# Patient Record
Sex: FEMALE | Race: WHITE | NOT HISPANIC OR LATINO | Employment: FULL TIME | URBAN - METROPOLITAN AREA
[De-identification: names, ages, dates, MRNs, and addresses within clinical notes are randomized per-mention and may not be internally consistent; named-entity substitution may affect disease eponyms.]

---

## 2023-06-05 LAB
BASOPHILS (10*3/UL) IN BLOOD BY AUTOMATED COUNT: 0.06 X10E9/L (ref 0–0.1)
BASOPHILS/100 LEUKOCYTES IN BLOOD BY AUTOMATED COUNT: 0.9 % (ref 0–2)
EOSINOPHILS (10*3/UL) IN BLOOD BY AUTOMATED COUNT: 0.06 X10E9/L (ref 0–0.7)
EOSINOPHILS/100 LEUKOCYTES IN BLOOD BY AUTOMATED COUNT: 0.9 % (ref 0–6)
ERYTHROCYTE DISTRIBUTION WIDTH (RATIO) BY AUTOMATED COUNT: 14.1 % (ref 11.5–14.5)
ERYTHROCYTE MEAN CORPUSCULAR HEMOGLOBIN CONCENTRATION (G/DL) BY AUTOMATED: 29.9 G/DL (ref 32–36)
ERYTHROCYTE MEAN CORPUSCULAR VOLUME (FL) BY AUTOMATED COUNT: 84 FL (ref 80–100)
ERYTHROCYTES (10*6/UL) IN BLOOD BY AUTOMATED COUNT: 4.7 X10E12/L (ref 4–5.2)
FOLLITROPIN (IU/L) IN SER/PLAS: 12.7 IU/L
HEMATOCRIT (%) IN BLOOD BY AUTOMATED COUNT: 39.4 % (ref 36–46)
HEMOGLOBIN (G/DL) IN BLOOD: 11.8 G/DL (ref 12–16)
IMMATURE GRANULOCYTES/100 LEUKOCYTES IN BLOOD BY AUTOMATED COUNT: 0.3 % (ref 0–0.9)
LEUKOCYTES (10*3/UL) IN BLOOD BY AUTOMATED COUNT: 6.4 X10E9/L (ref 4.4–11.3)
LUTEINIZING HORMONE (IU/ML) IN SER/PLAS: 53 IU/L
LYMPHOCYTES (10*3/UL) IN BLOOD BY AUTOMATED COUNT: 1.46 X10E9/L (ref 1.2–4.8)
LYMPHOCYTES/100 LEUKOCYTES IN BLOOD BY AUTOMATED COUNT: 22.9 % (ref 13–44)
MONOCYTES (10*3/UL) IN BLOOD BY AUTOMATED COUNT: 0.54 X10E9/L (ref 0.1–1)
MONOCYTES/100 LEUKOCYTES IN BLOOD BY AUTOMATED COUNT: 8.5 % (ref 2–10)
NEUTROPHILS (10*3/UL) IN BLOOD BY AUTOMATED COUNT: 4.24 X10E9/L (ref 1.2–7.7)
NEUTROPHILS/100 LEUKOCYTES IN BLOOD BY AUTOMATED COUNT: 66.5 % (ref 40–80)
NRBC (PER 100 WBCS) BY AUTOMATED COUNT: 0 /100 WBC (ref 0–0)
PLATELETS (10*3/UL) IN BLOOD AUTOMATED COUNT: 316 X10E9/L (ref 150–450)
PROLACTIN (UG/L) IN SER/PLAS: 6.8 UG/L (ref 3–20)
THYROTROPIN (MIU/L) IN SER/PLAS BY DETECTION LIMIT <= 0.05 MIU/L: 0.19 MIU/L (ref 0.44–3.98)
THYROXINE (T4) FREE (NG/DL) IN SER/PLAS: 0.86 NG/DL (ref 0.61–1.12)

## 2023-07-10 ENCOUNTER — HOSPITAL ENCOUNTER (OUTPATIENT)
Dept: DATA CONVERSION | Facility: HOSPITAL | Age: 28
End: 2023-07-10
Attending: OBSTETRICS & GYNECOLOGY | Admitting: OBSTETRICS & GYNECOLOGY

## 2023-07-10 DIAGNOSIS — N92.0 EXCESSIVE AND FREQUENT MENSTRUATION WITH REGULAR CYCLE: ICD-10-CM

## 2023-07-12 LAB
COMPLETE PATHOLOGY REPORT: NORMAL
CONVERTED CLINICAL DIAGNOSIS-HISTORY: NORMAL
CONVERTED FINAL DIAGNOSIS: NORMAL
CONVERTED FINAL REPORT PDF LINK TO COPY AND PASTE: NORMAL
CONVERTED GROSS DESCRIPTION: NORMAL

## 2023-09-29 VITALS — WEIGHT: 222.66 LBS | HEIGHT: 65 IN | BODY MASS INDEX: 37.1 KG/M2

## 2023-09-30 NOTE — H&P
"    History & Physical Reviewed:   Pregnant/Lactating:  ·  Are You Pregnant no (1)   ·  Are You Currently Breastfeeding no (1)     I have reviewed the History and Physical dated:  07-Jul-2023   History and Physical reviewed and relevant findings noted. Patient examined to review pertinent physical  findings.: No significant changes   Home Medications Reviewed: no changes noted   Allergies Reviewed: no changes noted       ERAS (Enhanced Recovery After Surgery):  ·  ERAS Patient: no     Consent:   COVID-19 Consent:  ·  COVID-19 Risk Consent Surgeon has reviewed key risks related to the risk of cristhian COVID-19 and if they contract COVID-19 what the risks are.       Electronic Signatures:  Jacky Carranza)  (Signed 10-Jul-2023 08:14)   Authored: History & Physical Reviewed, ERAS, Consent,  Note Completion      Last Updated: 10-Jul-2023 08:14 by Jacky Carranza (MD)    References:  1.  Data Referenced From \"Patient Profile - Preop v3\" 10-Jul-2023 07:36   "

## 2023-10-02 NOTE — OP NOTE
Post Operative Note:     PreOp Diagnosis: Menorrhagia   Post-Procedure Diagnosis: Same   Procedure: 1.  Hysteroscopy  2.  D&C  3.  NovaSure endometrial ablation  4.   5.   Surgeon: Dr. Carranza   Resident/Fellow/Other Assistant: None   Anesthesia: General LMA   Estimated Blood Loss (mL): Less than 5 cc   Specimen: yes. Endometrial curettings   Complications: None   Findings: Normal endometrial cavity     Operative Report Dictated:  Dictation: not applicable - note contains Operative  Report   Operative Report:    Patient was taken into the operative suite and after given adequate general LMA anesthesia was placed in the dorsolithotomy position prepped and draped in usual sterile  fashion.  Speculum was placed into the vagina the anterior lip of the cervix was grasped with a single-tooth tenaculum and the cervix was dilated to a #8 size Hegar dilator.  Hysteroscope was then introduced and saline was used as a dilating medium.   Upon visualization of the endometrial cavity there appeared to be normal uterine ostia bilaterally no evidence of submucous myomas or polyps.  At this point the hysteroscope was removed a medium size sharp curette was used to curette the cavity throughout  specimen was handed off to pathology.  Cavity length was determined to be 6 cm cavity width was determined to be 3.3 cm.  NovaSure endometrial ablation device was introduced cavity assessment was obtained procedure was commenced.  Once procedure was completed  device was removed hysteroscope was reintroduced there was adequate ablation throughout.  Tenaculum was taken off the anterior lip of the cervix this did have some spotty bleeding which was controlled with silver nitrate and pressure.  Patient tolerated  the procedure well there were no complications she was taken recovery room in stable condition.      Electronic Signatures:  Jacky Carranza)  (Signed 10-Jul-2023 09:14)   Authored: Post Operative Note, Note Completion      Last  Updated: 10-Jul-2023 09:14 by Jacky Carranza)

## 2025-02-25 ENCOUNTER — HOSPITAL ENCOUNTER (EMERGENCY)
Facility: HOSPITAL | Age: 30
Discharge: HOME | End: 2025-02-25
Attending: EMERGENCY MEDICINE
Payer: COMMERCIAL

## 2025-02-25 VITALS
DIASTOLIC BLOOD PRESSURE: 73 MMHG | BODY MASS INDEX: 28.32 KG/M2 | HEIGHT: 65 IN | RESPIRATION RATE: 20 BRPM | TEMPERATURE: 97.3 F | WEIGHT: 170 LBS | OXYGEN SATURATION: 97 % | HEART RATE: 96 BPM | SYSTOLIC BLOOD PRESSURE: 123 MMHG

## 2025-02-25 DIAGNOSIS — H66.002 NON-RECURRENT ACUTE SUPPURATIVE OTITIS MEDIA OF LEFT EAR WITHOUT SPONTANEOUS RUPTURE OF TYMPANIC MEMBRANE: Primary | ICD-10-CM

## 2025-02-25 PROCEDURE — 99283 EMERGENCY DEPT VISIT LOW MDM: CPT | Performed by: EMERGENCY MEDICINE

## 2025-02-25 PROCEDURE — 2500000001 HC RX 250 WO HCPCS SELF ADMINISTERED DRUGS (ALT 637 FOR MEDICARE OP): Performed by: EMERGENCY MEDICINE

## 2025-02-25 RX ORDER — AMOXICILLIN 500 MG/1
500 CAPSULE ORAL ONCE
Status: COMPLETED | OUTPATIENT
Start: 2025-02-25 | End: 2025-02-25

## 2025-02-25 RX ORDER — AMOXICILLIN 500 MG/1
500 CAPSULE ORAL 3 TIMES DAILY
Qty: 21 CAPSULE | Refills: 0 | Status: SHIPPED | OUTPATIENT
Start: 2025-02-25 | End: 2025-03-04

## 2025-02-25 RX ADMIN — AMOXICILLIN 500 MG: 500 CAPSULE ORAL at 04:42

## 2025-02-25 ASSESSMENT — COLUMBIA-SUICIDE SEVERITY RATING SCALE - C-SSRS
6. HAVE YOU EVER DONE ANYTHING, STARTED TO DO ANYTHING, OR PREPARED TO DO ANYTHING TO END YOUR LIFE?: NO
1. IN THE PAST MONTH, HAVE YOU WISHED YOU WERE DEAD OR WISHED YOU COULD GO TO SLEEP AND NOT WAKE UP?: NO
2. HAVE YOU ACTUALLY HAD ANY THOUGHTS OF KILLING YOURSELF?: NO

## 2025-02-25 ASSESSMENT — PAIN DESCRIPTION - PAIN TYPE: TYPE: ACUTE PAIN

## 2025-02-25 ASSESSMENT — PAIN DESCRIPTION - DESCRIPTORS: DESCRIPTORS: ACHING

## 2025-02-25 ASSESSMENT — PAIN DESCRIPTION - LOCATION: LOCATION: EAR

## 2025-02-25 ASSESSMENT — PAIN DESCRIPTION - ORIENTATION: ORIENTATION: LEFT

## 2025-02-25 ASSESSMENT — PAIN - FUNCTIONAL ASSESSMENT: PAIN_FUNCTIONAL_ASSESSMENT: 0-10

## 2025-02-25 ASSESSMENT — PAIN SCALES - GENERAL: PAINLEVEL_OUTOF10: 5 - MODERATE PAIN

## 2025-02-25 NOTE — ED TRIAGE NOTES
PT arrives via private vehicle from home with c/o Left air pain that started yesterday and is getting worse. PT took 600mg Ibuprofen around 0300 this morning, pt states it took the edge off, but did not resolve the symptom.

## 2025-02-25 NOTE — ED PROVIDER NOTES
HPI   Chief Complaint   Patient presents with    Earache       The patient is a 29-year-old female with a history of lupus not on active treatment here for left ear pain in the setting of recent viral URI.  She denies any fevers, chills, nausea, or vomiting.  She is experiencing some rhinorrhea, but denies sore throat and cough.  She has taken ibuprofen for the pain which helps, however she woke up approximately an hour and a half ago with severe pain which prompted her visit to the emergency department.              Patient History   Past Medical History:   Diagnosis Date    Encounter for supervision of normal first pregnancy, third trimester ([facility])     Encounter for prenatal care in third trimester of first pregnancy    Encounter for supervision of normal pregnancy, unspecified, unspecified trimester ([facility]) 2021    Pregnancy at early stage    Encounter for supervision of normal pregnancy, unspecified, unspecified trimester ([facility]) 2020    Pregnancy at early stage    Schizoaffective disorder, unspecified ([facility])     Schizo affective schizophrenia     Past Surgical History:   Procedure Laterality Date     SECTION, CLASSIC  10/27/2017     Section    DILATION AND CURETTAGE OF UTERUS  2017    Dilation And Curettage    OTHER SURGICAL HISTORY  2016    Biopsy Skin     No family history on file.  Social History     Tobacco Use    Smoking status: Not on file    Smokeless tobacco: Not on file   Substance Use Topics    Alcohol use: Not on file    Drug use: Not on file       Physical Exam   ED Triage Vitals   Temperature Heart Rate Respirations BP   258 258 258 25 0423   36.3 °C (97.3 °F) 96 20 123/73      Pulse Ox Temp Source Heart Rate Source Patient Position   25 0418 25 0418 25 0418 25   97 % Temporal Monitor Sitting      BP Location FiO2 (%)     25 --     Right arm        Physical Exam  General:  Alert and in no acute distress and sitting comfortably in the stretcher.  HEENT:  EOMI. pupils equal and round.  No conjunctival injection or hemorrhage.  Throat is not erythematous without tonsillar edema or exudate.  Right tympanic membrane is  opaque, but not erythematous.  Left tympanic membrane is partially obscured by purulence.  Observed membrane is erythematous.  No erythema of the external auditory canal on the left.  Respiratory: Nonlabored  Cardiovascular: Regular rate and regular rhythm.   Neurologic: Alert and answering questions appropriately.     ED Course & MDM   Diagnoses as of 02/25/25 0442   Non-recurrent acute suppurative otitis media of left ear without spontaneous rupture of tympanic membrane                 No data recorded     Amalia Coma Scale Score: 15 (02/25/25 0426 : Jay Wells RN)                           Medical Decision Making  Patient is a 29-year-old female here for left ear pain.  She is normotensive, nontachycardic, and afebrile.  Physical examination is consistent with acute otitis media.  She has no recent antibiotic use. patient was given a dose of amoxicillin here in the emergency department and discharged with a prescription for this.    She recently moved back to the [facility] from [place] and therefore was given a referral for primary care    Procedure  Procedures     Freda Christine MD  02/25/25 5416

## 2025-04-08 ENCOUNTER — APPOINTMENT (OUTPATIENT)
Dept: CARDIOLOGY | Facility: HOSPITAL | Age: 30
End: 2025-04-08
Payer: COMMERCIAL

## 2025-04-08 ENCOUNTER — APPOINTMENT (OUTPATIENT)
Dept: RADIOLOGY | Facility: HOSPITAL | Age: 30
End: 2025-04-08
Payer: COMMERCIAL

## 2025-04-08 ENCOUNTER — HOSPITAL ENCOUNTER (INPATIENT)
Facility: HOSPITAL | Age: 30
LOS: 2 days | Discharge: HOME | End: 2025-04-10
Attending: EMERGENCY MEDICINE | Admitting: INTERNAL MEDICINE
Payer: COMMERCIAL

## 2025-04-08 DIAGNOSIS — R06.02 SHORTNESS OF BREATH: ICD-10-CM

## 2025-04-08 DIAGNOSIS — J18.9 PNEUMONIA OF BOTH LUNGS DUE TO INFECTIOUS ORGANISM, UNSPECIFIED PART OF LUNG: Primary | ICD-10-CM

## 2025-04-08 DIAGNOSIS — R00.0 SINUS TACHYCARDIA: ICD-10-CM

## 2025-04-08 LAB
ALBUMIN SERPL BCP-MCNC: 4.2 G/DL (ref 3.4–5)
ALP SERPL-CCNC: 74 U/L (ref 33–110)
ALT SERPL W P-5'-P-CCNC: 18 U/L (ref 7–45)
ANION GAP SERPL CALC-SCNC: 13 MMOL/L (ref 10–20)
AST SERPL W P-5'-P-CCNC: 12 U/L (ref 9–39)
B-HCG SERPL-ACNC: <2 MIU/ML
BASE EXCESS BLDV CALC-SCNC: 2.2 MMOL/L (ref -2–3)
BASOPHILS # BLD AUTO: 0.02 X10*3/UL (ref 0–0.1)
BASOPHILS NFR BLD AUTO: 0.2 %
BILIRUB SERPL-MCNC: 1.1 MG/DL (ref 0–1.2)
BNP SERPL-MCNC: 8 PG/ML (ref 0–99)
BODY TEMPERATURE: 37 DEGREES CELSIUS
BUN SERPL-MCNC: 12 MG/DL (ref 6–23)
CALCIUM SERPL-MCNC: 9.2 MG/DL (ref 8.6–10.3)
CARDIAC TROPONIN I PNL SERPL HS: <3 NG/L (ref 0–13)
CARDIAC TROPONIN I PNL SERPL HS: <3 NG/L (ref 0–13)
CHLORIDE SERPL-SCNC: 103 MMOL/L (ref 98–107)
CO2 SERPL-SCNC: 25 MMOL/L (ref 21–32)
CREAT SERPL-MCNC: 0.68 MG/DL (ref 0.5–1.05)
D DIMER PPP FEU-MCNC: 266 NG/ML FEU
EGFRCR SERPLBLD CKD-EPI 2021: >90 ML/MIN/1.73M*2
EOSINOPHIL # BLD AUTO: 0.02 X10*3/UL (ref 0–0.7)
EOSINOPHIL NFR BLD AUTO: 0.2 %
ERYTHROCYTE [DISTWIDTH] IN BLOOD BY AUTOMATED COUNT: 13.3 % (ref 11.5–14.5)
FLUAV RNA RESP QL NAA+PROBE: NOT DETECTED
FLUBV RNA RESP QL NAA+PROBE: NOT DETECTED
GLUCOSE SERPL-MCNC: 98 MG/DL (ref 74–99)
HCO3 BLDV-SCNC: 25.3 MMOL/L (ref 22–26)
HCT VFR BLD AUTO: 43.2 % (ref 36–46)
HGB BLD-MCNC: 13.9 G/DL (ref 12–16)
IMM GRANULOCYTES # BLD AUTO: 0.06 X10*3/UL (ref 0–0.7)
IMM GRANULOCYTES NFR BLD AUTO: 0.5 % (ref 0–0.9)
INHALED O2 CONCENTRATION: 21 %
LACTATE SERPL-SCNC: 0.7 MMOL/L (ref 0.4–2)
LYMPHOCYTES # BLD AUTO: 0.95 X10*3/UL (ref 1.2–4.8)
LYMPHOCYTES NFR BLD AUTO: 8.5 %
MAGNESIUM SERPL-MCNC: 1.62 MG/DL (ref 1.6–2.4)
MCH RBC QN AUTO: 28.5 PG (ref 26–34)
MCHC RBC AUTO-ENTMCNC: 32.2 G/DL (ref 32–36)
MCV RBC AUTO: 89 FL (ref 80–100)
MONOCYTES # BLD AUTO: 0.43 X10*3/UL (ref 0.1–1)
MONOCYTES NFR BLD AUTO: 3.8 %
NEUTROPHILS # BLD AUTO: 9.76 X10*3/UL (ref 1.2–7.7)
NEUTROPHILS NFR BLD AUTO: 86.8 %
NRBC BLD-RTO: 0 /100 WBCS (ref 0–0)
OXYHGB MFR BLDV: 30.1 % (ref 45–75)
PCO2 BLDV: 34 MM HG (ref 41–51)
PH BLDV: 7.48 PH (ref 7.33–7.43)
PLATELET # BLD AUTO: 252 X10*3/UL (ref 150–450)
PO2 BLDV: 18 MM HG (ref 35–45)
POTASSIUM SERPL-SCNC: 3.6 MMOL/L (ref 3.5–5.3)
PROT SERPL-MCNC: 6.8 G/DL (ref 6.4–8.2)
RBC # BLD AUTO: 4.87 X10*6/UL (ref 4–5.2)
RSV RNA RESP QL NAA+PROBE: NOT DETECTED
SAO2 % BLDV: 32 % (ref 45–75)
SARS-COV-2 RNA RESP QL NAA+PROBE: NOT DETECTED
SODIUM SERPL-SCNC: 137 MMOL/L (ref 136–145)
TSH SERPL-ACNC: 0.72 MIU/L (ref 0.44–3.98)
WBC # BLD AUTO: 11.2 X10*3/UL (ref 4.4–11.3)

## 2025-04-08 PROCEDURE — 36415 COLL VENOUS BLD VENIPUNCTURE: CPT | Performed by: PHYSICIAN ASSISTANT

## 2025-04-08 PROCEDURE — 2500000004 HC RX 250 GENERAL PHARMACY W/ HCPCS (ALT 636 FOR OP/ED): Performed by: INTERNAL MEDICINE

## 2025-04-08 PROCEDURE — 85379 FIBRIN DEGRADATION QUANT: CPT | Performed by: PHYSICIAN ASSISTANT

## 2025-04-08 PROCEDURE — 71275 CT ANGIOGRAPHY CHEST: CPT

## 2025-04-08 PROCEDURE — 85025 COMPLETE CBC W/AUTO DIFF WBC: CPT | Performed by: PHYSICIAN ASSISTANT

## 2025-04-08 PROCEDURE — 2500000004 HC RX 250 GENERAL PHARMACY W/ HCPCS (ALT 636 FOR OP/ED): Performed by: PHYSICIAN ASSISTANT

## 2025-04-08 PROCEDURE — 2550000001 HC RX 255 CONTRASTS: Performed by: PHYSICIAN ASSISTANT

## 2025-04-08 PROCEDURE — 71275 CT ANGIOGRAPHY CHEST: CPT | Performed by: RADIOLOGY

## 2025-04-08 PROCEDURE — 2500000004 HC RX 250 GENERAL PHARMACY W/ HCPCS (ALT 636 FOR OP/ED): Performed by: NURSE PRACTITIONER

## 2025-04-08 PROCEDURE — 83605 ASSAY OF LACTIC ACID: CPT | Performed by: PHYSICIAN ASSISTANT

## 2025-04-08 PROCEDURE — 87449 NOS EACH ORGANISM AG IA: CPT | Mod: PARLAB | Performed by: NURSE PRACTITIONER

## 2025-04-08 PROCEDURE — 99285 EMERGENCY DEPT VISIT HI MDM: CPT | Mod: 25 | Performed by: EMERGENCY MEDICINE

## 2025-04-08 PROCEDURE — 83735 ASSAY OF MAGNESIUM: CPT | Performed by: PHYSICIAN ASSISTANT

## 2025-04-08 PROCEDURE — 82810 BLOOD GASES O2 SAT ONLY: CPT | Performed by: PHYSICIAN ASSISTANT

## 2025-04-08 PROCEDURE — 71045 X-RAY EXAM CHEST 1 VIEW: CPT | Performed by: STUDENT IN AN ORGANIZED HEALTH CARE EDUCATION/TRAINING PROGRAM

## 2025-04-08 PROCEDURE — 96361 HYDRATE IV INFUSION ADD-ON: CPT

## 2025-04-08 PROCEDURE — 87040 BLOOD CULTURE FOR BACTERIA: CPT | Mod: PARLAB | Performed by: PHYSICIAN ASSISTANT

## 2025-04-08 PROCEDURE — 93005 ELECTROCARDIOGRAM TRACING: CPT

## 2025-04-08 PROCEDURE — 71045 X-RAY EXAM CHEST 1 VIEW: CPT

## 2025-04-08 PROCEDURE — 1200000002 HC GENERAL ROOM WITH TELEMETRY DAILY

## 2025-04-08 PROCEDURE — 87636 SARSCOV2 & INF A&B AMP PRB: CPT | Performed by: PHYSICIAN ASSISTANT

## 2025-04-08 PROCEDURE — 87899 AGENT NOS ASSAY W/OPTIC: CPT | Mod: PARLAB | Performed by: NURSE PRACTITIONER

## 2025-04-08 PROCEDURE — 83880 ASSAY OF NATRIURETIC PEPTIDE: CPT | Performed by: PHYSICIAN ASSISTANT

## 2025-04-08 PROCEDURE — 96375 TX/PRO/DX INJ NEW DRUG ADDON: CPT

## 2025-04-08 PROCEDURE — 84702 CHORIONIC GONADOTROPIN TEST: CPT | Performed by: PHYSICIAN ASSISTANT

## 2025-04-08 PROCEDURE — 84443 ASSAY THYROID STIM HORMONE: CPT | Performed by: PHYSICIAN ASSISTANT

## 2025-04-08 PROCEDURE — 84075 ASSAY ALKALINE PHOSPHATASE: CPT | Performed by: PHYSICIAN ASSISTANT

## 2025-04-08 PROCEDURE — 96365 THER/PROPH/DIAG IV INF INIT: CPT

## 2025-04-08 PROCEDURE — 84484 ASSAY OF TROPONIN QUANT: CPT | Performed by: PHYSICIAN ASSISTANT

## 2025-04-08 RX ORDER — CEFTRIAXONE 2 G/50ML
2 INJECTION, SOLUTION INTRAVENOUS EVERY 24 HOURS
Status: DISCONTINUED | OUTPATIENT
Start: 2025-04-09 | End: 2025-04-10 | Stop reason: HOSPADM

## 2025-04-08 RX ORDER — IPRATROPIUM BROMIDE AND ALBUTEROL SULFATE 2.5; .5 MG/3ML; MG/3ML
3 SOLUTION RESPIRATORY (INHALATION)
Status: DISCONTINUED | OUTPATIENT
Start: 2025-04-08 | End: 2025-04-08

## 2025-04-08 RX ORDER — DEXTROAMPHETAMINE SACCHARATE, AMPHETAMINE ASPARTATE, DEXTROAMPHETAMINE SULFATE AND AMPHETAMINE SULFATE 7.5; 7.5; 7.5; 7.5 MG/1; MG/1; MG/1; MG/1
1 TABLET ORAL
COMMUNITY
Start: 2025-04-01

## 2025-04-08 RX ORDER — IPRATROPIUM BROMIDE AND ALBUTEROL SULFATE 2.5; .5 MG/3ML; MG/3ML
3 SOLUTION RESPIRATORY (INHALATION) EVERY 2 HOUR PRN
Status: DISCONTINUED | OUTPATIENT
Start: 2025-04-08 | End: 2025-04-10 | Stop reason: HOSPADM

## 2025-04-08 RX ORDER — DEXTROAMPHETAMINE SACCHARATE, AMPHETAMINE ASPARTATE, DEXTROAMPHETAMINE SULFATE AND AMPHETAMINE SULFATE 7.5; 7.5; 7.5; 7.5 MG/1; MG/1; MG/1; MG/1
30 TABLET ORAL
Status: DISCONTINUED | OUTPATIENT
Start: 2025-04-08 | End: 2025-04-10 | Stop reason: HOSPADM

## 2025-04-08 RX ORDER — LANOLIN ALCOHOL/MO/W.PET/CERES
400 CREAM (GRAM) TOPICAL DAILY
Status: DISCONTINUED | OUTPATIENT
Start: 2025-04-09 | End: 2025-04-10 | Stop reason: HOSPADM

## 2025-04-08 RX ORDER — IPRATROPIUM BROMIDE AND ALBUTEROL SULFATE 2.5; .5 MG/3ML; MG/3ML
3 SOLUTION RESPIRATORY (INHALATION)
Status: DISCONTINUED | OUTPATIENT
Start: 2025-04-09 | End: 2025-04-10 | Stop reason: HOSPADM

## 2025-04-08 RX ORDER — SODIUM CHLORIDE, SODIUM LACTATE, POTASSIUM CHLORIDE, CALCIUM CHLORIDE 600; 310; 30; 20 MG/100ML; MG/100ML; MG/100ML; MG/100ML
50 INJECTION, SOLUTION INTRAVENOUS CONTINUOUS
Status: ACTIVE | OUTPATIENT
Start: 2025-04-08 | End: 2025-04-09

## 2025-04-08 RX ORDER — BENZONATATE 100 MG/1
100 CAPSULE ORAL 3 TIMES DAILY PRN
Status: DISCONTINUED | OUTPATIENT
Start: 2025-04-08 | End: 2025-04-10 | Stop reason: HOSPADM

## 2025-04-08 RX ORDER — GUAIFENESIN 600 MG/1
600 TABLET, EXTENDED RELEASE ORAL 2 TIMES DAILY
Status: DISCONTINUED | OUTPATIENT
Start: 2025-04-08 | End: 2025-04-10 | Stop reason: HOSPADM

## 2025-04-08 RX ORDER — CEFTRIAXONE 2 G/50ML
2 INJECTION, SOLUTION INTRAVENOUS ONCE
Status: COMPLETED | OUTPATIENT
Start: 2025-04-08 | End: 2025-04-08

## 2025-04-08 RX ORDER — ACETAMINOPHEN 325 MG/1
650 TABLET ORAL EVERY 4 HOURS PRN
Status: DISCONTINUED | OUTPATIENT
Start: 2025-04-08 | End: 2025-04-10 | Stop reason: HOSPADM

## 2025-04-08 RX ADMIN — CEFTRIAXONE SODIUM 2 G: 2 INJECTION, SOLUTION INTRAVENOUS at 21:22

## 2025-04-08 RX ADMIN — SODIUM CHLORIDE 1000 ML: 0.9 INJECTION, SOLUTION INTRAVENOUS at 19:34

## 2025-04-08 RX ADMIN — AZITHROMYCIN MONOHYDRATE 500 MG: 500 INJECTION, POWDER, LYOPHILIZED, FOR SOLUTION INTRAVENOUS at 21:48

## 2025-04-08 RX ADMIN — IOHEXOL 80 ML: 350 INJECTION, SOLUTION INTRAVENOUS at 20:13

## 2025-04-08 RX ADMIN — GUAIFENESIN 600 MG: 600 TABLET, EXTENDED RELEASE ORAL at 22:18

## 2025-04-08 RX ADMIN — SODIUM CHLORIDE, SODIUM LACTATE, POTASSIUM CHLORIDE, AND CALCIUM CHLORIDE 50 ML/HR: .6; .31; .03; .02 INJECTION, SOLUTION INTRAVENOUS at 22:18

## 2025-04-08 RX ADMIN — SODIUM CHLORIDE 1000 ML: 9 INJECTION, SOLUTION INTRAVENOUS at 18:43

## 2025-04-08 RX ADMIN — DEXAMETHASONE SODIUM PHOSPHATE 4 MG: 4 INJECTION, SOLUTION INTRAMUSCULAR; INTRAVENOUS at 21:48

## 2025-04-08 ASSESSMENT — PAIN SCALES - GENERAL
PAINLEVEL_OUTOF10: 3
PAINLEVEL_OUTOF10: 4

## 2025-04-08 ASSESSMENT — PAIN DESCRIPTION - DESCRIPTORS: DESCRIPTORS: TIGHTNESS

## 2025-04-08 NOTE — ED TRIAGE NOTES
Patient states cough, coughed this AM- had sharp chest pain following the cough that has lasted all day. Patient also endorses shortness of breath, feels like SOB is worse than cp. Patient also has lupus.

## 2025-04-09 LAB
25(OH)D3 SERPL-MCNC: 10 NG/ML (ref 30–100)
ANION GAP SERPL CALC-SCNC: 13 MMOL/L (ref 10–20)
BUN SERPL-MCNC: 8 MG/DL (ref 6–23)
CALCIUM SERPL-MCNC: 8.8 MG/DL (ref 8.6–10.3)
CHLORIDE SERPL-SCNC: 105 MMOL/L (ref 98–107)
CO2 SERPL-SCNC: 21 MMOL/L (ref 21–32)
CREAT SERPL-MCNC: 0.48 MG/DL (ref 0.5–1.05)
EGFRCR SERPLBLD CKD-EPI 2021: >90 ML/MIN/1.73M*2
ERYTHROCYTE [DISTWIDTH] IN BLOOD BY AUTOMATED COUNT: 12.9 % (ref 11.5–14.5)
GLUCOSE BLD MANUAL STRIP-MCNC: 120 MG/DL (ref 74–99)
GLUCOSE SERPL-MCNC: 123 MG/DL (ref 74–99)
HCT VFR BLD AUTO: 40.6 % (ref 36–46)
HGB BLD-MCNC: 12.4 G/DL (ref 12–16)
HIV 1+2 AB+HIV1 P24 AG SERPL QL IA: NONREACTIVE
HOLD SPECIMEN: NORMAL
LACTATE SERPL-SCNC: 0.7 MMOL/L (ref 0.4–2)
LEGIONELLA AG UR QL: NEGATIVE
MCH RBC QN AUTO: 28.4 PG (ref 26–34)
MCHC RBC AUTO-ENTMCNC: 30.5 G/DL (ref 32–36)
MCV RBC AUTO: 93 FL (ref 80–100)
NRBC BLD-RTO: 0 /100 WBCS (ref 0–0)
PLATELET # BLD AUTO: 219 X10*3/UL (ref 150–450)
POTASSIUM SERPL-SCNC: 4.2 MMOL/L (ref 3.5–5.3)
RBC # BLD AUTO: 4.37 X10*6/UL (ref 4–5.2)
S PNEUM AG UR QL: NEGATIVE
SODIUM SERPL-SCNC: 135 MMOL/L (ref 136–145)
WBC # BLD AUTO: 13.3 X10*3/UL (ref 4.4–11.3)

## 2025-04-09 PROCEDURE — 82306 VITAMIN D 25 HYDROXY: CPT | Mod: PARLAB | Performed by: NURSE PRACTITIONER

## 2025-04-09 PROCEDURE — 2500000004 HC RX 250 GENERAL PHARMACY W/ HCPCS (ALT 636 FOR OP/ED): Performed by: NURSE PRACTITIONER

## 2025-04-09 PROCEDURE — 2500000001 HC RX 250 WO HCPCS SELF ADMINISTERED DRUGS (ALT 637 FOR MEDICARE OP): Performed by: NURSE PRACTITIONER

## 2025-04-09 PROCEDURE — 83605 ASSAY OF LACTIC ACID: CPT | Performed by: INTERNAL MEDICINE

## 2025-04-09 PROCEDURE — 87449 NOS EACH ORGANISM AG IA: CPT | Mod: PARLAB | Performed by: INTERNAL MEDICINE

## 2025-04-09 PROCEDURE — 87389 HIV-1 AG W/HIV-1&-2 AB AG IA: CPT | Mod: PARLAB | Performed by: INTERNAL MEDICINE

## 2025-04-09 PROCEDURE — 2500000004 HC RX 250 GENERAL PHARMACY W/ HCPCS (ALT 636 FOR OP/ED): Performed by: INTERNAL MEDICINE

## 2025-04-09 PROCEDURE — 80048 BASIC METABOLIC PNL TOTAL CA: CPT | Performed by: NURSE PRACTITIONER

## 2025-04-09 PROCEDURE — 1200000002 HC GENERAL ROOM WITH TELEMETRY DAILY

## 2025-04-09 PROCEDURE — 87899 AGENT NOS ASSAY W/OPTIC: CPT | Mod: PARLAB | Performed by: INTERNAL MEDICINE

## 2025-04-09 PROCEDURE — 36415 COLL VENOUS BLD VENIPUNCTURE: CPT | Performed by: INTERNAL MEDICINE

## 2025-04-09 PROCEDURE — 2500000002 HC RX 250 W HCPCS SELF ADMINISTERED DRUGS (ALT 637 FOR MEDICARE OP, ALT 636 FOR OP/ED): Performed by: EMERGENCY MEDICINE

## 2025-04-09 PROCEDURE — 2500000005 HC RX 250 GENERAL PHARMACY W/O HCPCS: Performed by: INTERNAL MEDICINE

## 2025-04-09 PROCEDURE — 85027 COMPLETE CBC AUTOMATED: CPT | Performed by: NURSE PRACTITIONER

## 2025-04-09 PROCEDURE — 82947 ASSAY GLUCOSE BLOOD QUANT: CPT

## 2025-04-09 PROCEDURE — 94640 AIRWAY INHALATION TREATMENT: CPT

## 2025-04-09 RX ORDER — SODIUM CHLORIDE 9 MG/ML
100 INJECTION, SOLUTION INTRAVENOUS CONTINUOUS
Status: ACTIVE | OUTPATIENT
Start: 2025-04-09 | End: 2025-04-10

## 2025-04-09 RX ADMIN — AZITHROMYCIN MONOHYDRATE 500 MG: 500 INJECTION, POWDER, LYOPHILIZED, FOR SOLUTION INTRAVENOUS at 21:32

## 2025-04-09 RX ADMIN — Medication 2 L/MIN: at 10:00

## 2025-04-09 RX ADMIN — IPRATROPIUM BROMIDE AND ALBUTEROL SULFATE 3 ML: .5; 3 SOLUTION RESPIRATORY (INHALATION) at 07:08

## 2025-04-09 RX ADMIN — GUAIFENESIN 600 MG: 600 TABLET, EXTENDED RELEASE ORAL at 09:28

## 2025-04-09 RX ADMIN — IPRATROPIUM BROMIDE AND ALBUTEROL SULFATE 3 ML: .5; 3 SOLUTION RESPIRATORY (INHALATION) at 19:12

## 2025-04-09 RX ADMIN — ACETAMINOPHEN 650 MG: 325 TABLET, FILM COATED ORAL at 17:10

## 2025-04-09 RX ADMIN — MAGNESIUM OXIDE TAB 400 MG (241.3 MG ELEMENTAL MG) 400 MG: 400 (241.3 MG) TAB at 09:28

## 2025-04-09 RX ADMIN — CEFTRIAXONE SODIUM 2 G: 2 INJECTION, SOLUTION INTRAVENOUS at 20:36

## 2025-04-09 RX ADMIN — GUAIFENESIN 600 MG: 600 TABLET, EXTENDED RELEASE ORAL at 21:32

## 2025-04-09 RX ADMIN — IPRATROPIUM BROMIDE AND ALBUTEROL SULFATE 3 ML: .5; 3 SOLUTION RESPIRATORY (INHALATION) at 11:51

## 2025-04-09 RX ADMIN — SODIUM CHLORIDE 100 ML/HR: 0.9 INJECTION, SOLUTION INTRAVENOUS at 15:01

## 2025-04-09 RX ADMIN — DEXAMETHASONE SODIUM PHOSPHATE 4 MG: 4 INJECTION, SOLUTION INTRAMUSCULAR; INTRAVENOUS at 14:42

## 2025-04-09 SDOH — SOCIAL STABILITY: SOCIAL INSECURITY: DO YOU FEEL ANYONE HAS EXPLOITED OR TAKEN ADVANTAGE OF YOU FINANCIALLY OR OF YOUR PERSONAL PROPERTY?: NO

## 2025-04-09 SDOH — ECONOMIC STABILITY: TRANSPORTATION INSECURITY
IN THE PAST 12 MONTHS, HAS LACK OF TRANSPORTATION KEPT YOU FROM MEETINGS, WORK, OR FROM GETTING THINGS NEEDED FOR DAILY LIVING?: YES

## 2025-04-09 SDOH — ECONOMIC STABILITY: TRANSPORTATION INSECURITY
IN THE PAST 12 MONTHS, HAS THE LACK OF TRANSPORTATION KEPT YOU FROM MEDICAL APPOINTMENTS OR FROM GETTING MEDICATIONS?: YES

## 2025-04-09 SDOH — SOCIAL STABILITY: SOCIAL INSECURITY: DOES ANYONE TRY TO KEEP YOU FROM HAVING/CONTACTING OTHER FRIENDS OR DOING THINGS OUTSIDE YOUR HOME?: NO

## 2025-04-09 SDOH — ECONOMIC STABILITY: HOUSING INSECURITY

## 2025-04-09 SDOH — ECONOMIC STABILITY: GENERAL

## 2025-04-09 SDOH — ECONOMIC STABILITY: FOOD INSECURITY: WITHIN THE PAST 12 MONTHS, THE FOOD YOU BOUGHT JUST DIDN'T LAST AND YOU DIDN'T HAVE MONEY TO GET MORE.: NEVER TRUE

## 2025-04-09 SDOH — ECONOMIC STABILITY: HOUSING INSECURITY: IN THE LAST 12 MONTHS, HOW MANY PLACES HAVE YOU LIVED?: 3

## 2025-04-09 SDOH — SOCIAL STABILITY: SOCIAL INSECURITY: HAVE YOU HAD THOUGHTS OF HARMING ANYONE ELSE?: NO

## 2025-04-09 SDOH — ECONOMIC STABILITY: INCOME INSECURITY: IN THE LAST 12 MONTHS, WAS THERE A TIME WHEN YOU WERE NOT ABLE TO PAY THE MORTGAGE OR RENT ON TIME?: YES

## 2025-04-09 SDOH — ECONOMIC STABILITY: TRANSPORTATION INSECURITY

## 2025-04-09 SDOH — ECONOMIC STABILITY: FOOD INSECURITY: WITHIN THE PAST 12 MONTHS, YOU WORRIED THAT YOUR FOOD WOULD RUN OUT BEFORE YOU GOT MONEY TO BUY MORE.: SOMETIMES TRUE

## 2025-04-09 SDOH — ECONOMIC STABILITY: HOUSING INSECURITY: IN THE LAST 12 MONTHS, WAS THERE A TIME WHEN YOU WERE NOT ABLE TO PAY THE MORTGAGE OR RENT ON TIME?: YES

## 2025-04-09 SDOH — ECONOMIC STABILITY: TRANSPORTATION INSECURITY: IN THE PAST 12 MONTHS, HAS LACK OF TRANSPORTATION KEPT YOU FROM MEDICAL APPOINTMENTS OR FROM GETTING MEDICATIONS?: YES

## 2025-04-09 SDOH — SOCIAL STABILITY: SOCIAL INSECURITY: DO YOU FEEL UNSAFE GOING BACK TO THE PLACE WHERE YOU ARE LIVING?: NO

## 2025-04-09 SDOH — SOCIAL STABILITY: SOCIAL INSECURITY: ABUSE: ADULT

## 2025-04-09 SDOH — SOCIAL STABILITY: SOCIAL INSECURITY: ARE YOU OR HAVE YOU BEEN THREATENED OR ABUSED PHYSICALLY, EMOTIONALLY, OR SEXUALLY BY ANYONE?: NO

## 2025-04-09 SDOH — SOCIAL STABILITY: SOCIAL INSECURITY: WERE YOU ABLE TO COMPLETE ALL THE BEHAVIORAL HEALTH SCREENINGS?: YES

## 2025-04-09 SDOH — SOCIAL STABILITY: SOCIAL INSECURITY: HAS ANYONE EVER THREATENED TO HURT YOUR FAMILY OR YOUR PETS?: NO

## 2025-04-09 SDOH — ECONOMIC STABILITY: HOUSING INSECURITY
IN THE LAST 12 MONTHS, WAS THERE A TIME WHEN YOU DID NOT HAVE A STEADY PLACE TO SLEEP OR SLEPT IN A SHELTER (INCLUDING NOW)?: YES

## 2025-04-09 SDOH — SOCIAL STABILITY: SOCIAL INSECURITY: ARE THERE ANY APPARENT SIGNS OF INJURIES/BEHAVIORS THAT COULD BE RELATED TO ABUSE/NEGLECT?: NO

## 2025-04-09 SDOH — ECONOMIC STABILITY: HOUSING INSECURITY: IN THE PAST 12 MONTHS HAS THE ELECTRIC, GAS, OIL, OR WATER COMPANY THREATENED TO SHUT OFF SERVICES IN YOUR HOME?: YES

## 2025-04-09 SDOH — ECONOMIC STABILITY: FOOD INSECURITY
WITHIN THE PAST 12 MONTHS, YOU WORRIED THAT YOUR FOOD WOULD RUN OUT BEFORE YOU GOT THE MONEY TO BUY MORE.: SOMETIMES TRUE

## 2025-04-09 SDOH — ECONOMIC STABILITY: FOOD INSECURITY

## 2025-04-09 SDOH — SOCIAL STABILITY: SOCIAL INSECURITY: HAVE YOU HAD ANY THOUGHTS OF HARMING ANYONE ELSE?: NO

## 2025-04-09 ASSESSMENT — COGNITIVE AND FUNCTIONAL STATUS - GENERAL
DAILY ACTIVITIY SCORE: 24
DAILY ACTIVITIY SCORE: 24
MOBILITY SCORE: 24
PATIENT BASELINE BEDBOUND: NO
MOBILITY SCORE: 24

## 2025-04-09 ASSESSMENT — LIFESTYLE VARIABLES
SUBSTANCE_ABUSE_PAST_12_MONTHS: NO
HOW MANY STANDARD DRINKS CONTAINING ALCOHOL DO YOU HAVE ON A TYPICAL DAY: PATIENT DOES NOT DRINK
SKIP TO QUESTIONS 9-10: 1
HOW OFTEN DO YOU HAVE 6 OR MORE DRINKS ON ONE OCCASION: NEVER
HOW OFTEN DO YOU HAVE A DRINK CONTAINING ALCOHOL: NEVER
PRESCIPTION_ABUSE_PAST_12_MONTHS: NO
AUDIT-C TOTAL SCORE: 0
AUDIT-C TOTAL SCORE: 0

## 2025-04-09 ASSESSMENT — PAIN SCALES - WONG BAKER: WONGBAKER_NUMERICALRESPONSE: NO HURT

## 2025-04-09 ASSESSMENT — ACTIVITIES OF DAILY LIVING (ADL)
LACK_OF_TRANSPORTATION: YES
TOILETING: INDEPENDENT
DRESSING YOURSELF: INDEPENDENT
JUDGMENT_ADEQUATE_SAFELY_COMPLETE_DAILY_ACTIVITIES: YES
ADEQUATE_TO_COMPLETE_ADL: YES
WALKS IN HOME: INDEPENDENT
PATIENT'S MEMORY ADEQUATE TO SAFELY COMPLETE DAILY ACTIVITIES?: YES
BATHING: INDEPENDENT
HEARING - RIGHT EAR: FUNCTIONAL
HEARING - LEFT EAR: FUNCTIONAL
GROOMING: INDEPENDENT
FEEDING YOURSELF: INDEPENDENT

## 2025-04-09 ASSESSMENT — SOCIAL DETERMINANTS OF HEALTH (SDOH): IN THE PAST 12 MONTHS, HAS THE ELECTRIC, GAS, OIL, OR WATER COMPANY THREATENED TO SHUT OFF SERVICE IN YOUR HOME?: YES

## 2025-04-09 ASSESSMENT — PATIENT HEALTH QUESTIONNAIRE - PHQ9
1. LITTLE INTEREST OR PLEASURE IN DOING THINGS: NOT AT ALL
2. FEELING DOWN, DEPRESSED OR HOPELESS: NOT AT ALL
SUM OF ALL RESPONSES TO PHQ9 QUESTIONS 1 & 2: 0

## 2025-04-09 ASSESSMENT — PAIN SCALES - GENERAL
PAINLEVEL_OUTOF10: 0 - NO PAIN
PAINLEVEL_OUTOF10: 0 - NO PAIN
PAINLEVEL_OUTOF10: 5 - MODERATE PAIN
PAINLEVEL_OUTOF10: 0 - NO PAIN

## 2025-04-09 ASSESSMENT — PAIN DESCRIPTION - LOCATION: LOCATION: HEAD

## 2025-04-09 NOTE — CARE PLAN
The patient's goals for the shift include      The clinical goals for the shift include DECREASE SOB    Over the shift, the patient did make progress toward the following goals.

## 2025-04-09 NOTE — CARE PLAN
The patient's goals for the shift include      The clinical goals for the shift include      Over the shift, the patient did not make progress toward the following goals. Barriers to progression include shortness of breath. Recommendations to address these barriers include oxygen use.

## 2025-04-09 NOTE — ED PROVIDER NOTES
HPI   Chief Complaint   Patient presents with    Shortness of Breath       29-year-old female with a reported past medical history of lupus presents complaining of chest pain and shortness of breath.  Patient states symptoms for the past few days.  She reports a sudden onset of pleuritic discomfort to the right chest.  Patient states around the same time she began experiencing a nonproductive cough.  Denies fevers or myalgias.  No reports of syncope and or diaphoresis.  Denies any sensation of ripping or tearing pain.  She reports that she is not currently being treated for her lupus.  She endorses no recent travel or surgery.  She states no pain or asymmetry to her right lower extremities.  Patient denies any use of hormonal therapy              Patient History   Past Medical History:   Diagnosis Date    Encounter for supervision of normal first pregnancy, third trimester     Encounter for prenatal care in third trimester of first pregnancy    Encounter for supervision of normal pregnancy, unspecified, unspecified trimester 2021    Pregnancy at early stage    Encounter for supervision of normal pregnancy, unspecified, unspecified trimester 2020    Pregnancy at early stage    Schizoaffective disorder, unspecified     Schizo affective schizophrenia     Past Surgical History:   Procedure Laterality Date     SECTION, CLASSIC  10/27/2017     Section    DILATION AND CURETTAGE OF UTERUS  2017    Dilation And Curettage    OTHER SURGICAL HISTORY  2016    Biopsy Skin     No family history on file.  Social History     Tobacco Use    Smoking status: Never    Smokeless tobacco: Never   Vaping Use    Vaping status: Never Used   Substance Use Topics    Alcohol use: Not Currently    Drug use: Never       Physical Exam   ED Triage Vitals   Temperature Heart Rate Respirations BP   25 1735 25 1735 25 1735 25 1735   36.3 °C (97.3 °F) (!) 126 20 131/83      Pulse Ox Temp  Source Heart Rate Source Patient Position   04/08/25 1735 04/08/25 1930 04/08/25 1922 04/08/25 2030   95 % Temporal Monitor Lying      BP Location FiO2 (%)     04/08/25 2030 --     Right arm        Physical Exam  Vitals and nursing note reviewed.   Constitutional:       General: She is not in acute distress.     Appearance: Normal appearance. She is normal weight. She is not ill-appearing, toxic-appearing or diaphoretic.   HENT:      Head: Normocephalic.      Nose: Nose normal.      Mouth/Throat:      Mouth: Mucous membranes are moist.   Eyes:      Extraocular Movements: Extraocular movements intact.      Conjunctiva/sclera: Conjunctivae normal.   Cardiovascular:      Rate and Rhythm: Regular rhythm. Tachycardia present.      Pulses: Normal pulses.   Pulmonary:      Effort: Pulmonary effort is normal. Tachypnea present.      Breath sounds: Normal breath sounds.   Abdominal:      General: Abdomen is flat. There is no distension.      Palpations: Abdomen is soft.      Tenderness: There is no abdominal tenderness. There is no guarding or rebound.   Musculoskeletal:         General: Normal range of motion.      Cervical back: Normal range of motion and neck supple.   Skin:     General: Skin is warm and dry.      Capillary Refill: Capillary refill takes less than 2 seconds.   Neurological:      General: No focal deficit present.      Mental Status: She is alert and oriented to person, place, and time.   Psychiatric:         Mood and Affect: Mood normal.         Behavior: Behavior normal.         Thought Content: Thought content normal.         Judgment: Judgment normal.           ED Course & MDM   ED Course as of 04/08/25 2128   Tue Apr 08, 2025   1830 EKG was obtained and interpreted by myself revealing sinus tachycardia at a rate of 128.  QRS duration 70.  Normal axis.  No evidence of acute STEMI.  Patient was found to have nonspecific T wave abnormalities which appear to be consistent with previous studies [DS]   1906  IMPRESSION:  No acute cardiopulmonary process.      Stable subsegmental left retrocardiac atelectasis.      MACRO:  None.   [DS]   1906 Creatinine: 0.68 [DS]   1906 Bicarbonate: 25 [DS]   1906 BNP: 8 [DS]   1906 Troponin I, High Sensitivity: <3 [DS]   1906 D-Dimer Non VTE, Quant (ng/mL FEU): 266 [DS]   1906 HCG, Beta-Quantitative: <2 [DS]   1906 WBC: 11.2 [DS]   1906 HEMOGLOBIN: 13.9 [DS]   2006 MAGNESIUM: 1.62 [DS]   2006 Thyroid Stimulating Hormone: 0.72 [DS]   2006 Troponin I, High Sensitivity: <3 [DS]   2103 CT IMPRESSION:  1.  No acute pulmonary embolism to the segmental arterial level.  2. There are patchy bilateral airspace and ground-glass opacities  with area of consolidation in the left lower lobe concerning for  multifocal pneumonia. Clinical correlation and posttreatment  follow-up is recommended to ensure complete resolution.  3. Heterogeneous enlarged right lobe of the thyroid gland with a 3 cm  hypodense nodule. Correlate with thyroid function tests and  nonemergent thyroid ultrasound.  4. Additional findings as described above.   [DS]      ED Course User Index  [DS] Giovanny Feliz PA-C         Diagnoses as of 04/08/25 2128   Pneumonia of both lungs due to infectious organism, unspecified part of lung   Shortness of breath   Sinus tachycardia                 No data recorded     Amalia Coma Scale Score: 15 (04/08/25 1856 : Ara Cortez RN)                           Medical Decision Making    Medical Decision Making & ED Course  Medical Decision Making:  Arrival the patient was found to be tachycardic.  EKG was obtained and revealed sinus tachycardia which appeared to be similar to previous.  There is no evidence of acute STEMI.  Extensive lab work was obtained revealing no evidence of leukocytosis.  Patient was ruled out by way of cardiac enzymes.  D-dimer negative.  BNP 8.  Bicarbonate 25.  Hemoglobin stable at 13.9.  During the patient's stay she received 2 L of normal saline.  Patient's heart  rate was reassessed and found to be improved however she remains tachycardic.  CT PE study revealed no evidence of pulmonary embolism however the patient was found to findings concerning for multifocal pneumonia.  Incidentally the patient was found to have.  Heterogeneous enlargement of the right lobe of the thyroid gland with a 3 cm hypodense nodule.  The patient was found have a normal TSH.  Patient was notified of the findings along with the incidental findings.  Patient continues to remain tachypneic.  Oxygen saturation remains appropriate.  At this point given the persistent tachycardia and tachypnea with the multifocal pneumonia recommended admission to the hospital which she was agreeable to.  Patient will be admitted to the medicine on-call service  --  Scoring Tools Utilized: None    Differential diagnoses considered include but are not limited to: Pneumonia, PE, Covid, RSV     Social Determinants of Health which Significantly Impact Care: None identified The following actions were taken to address these social determinants: None    EKG Independent Interpretation: EKG interpreted by myself. Please see ED Course for full interpretation.    Independent Result Review and Interpretation: Relevant laboratory and radiographic results were reviewed and independently interpreted by myself.  As necessary, they are commented on in the ED Course.    Chronic conditions affecting the patient's care: None    The patient was discussed with the following consultants/services: Hospitalist/Admitting Provider who accepted the patient for admission    Care Considerations: None    ED Course:  ED Course as of 04/08/25 2130  ------------------------------------------------------------  Time: 04/08 1830  Comment: EKG was obtained and interpreted by myself revealing sinus tachycardia at a rate of 128.  QRS duration 70.  Normal axis.  No evidence of acute STEMI.  Patient was found to have nonspecific T wave abnormalities which  appear to be consistent with previous studies  By: HUEY Monahan-C  ------------------------------------------------------------  Time: 04/08 1906  Comment: IMPRESSION:No acute cardiopulmonary process.  Stable subsegmental left retrocardiac atelectasis.  MACRO:None.  By: HUEY Monahan-C  ------------------------------------------------------------  Time: 04/08 1906  Value: Creatinine: 0.68  Comment: (Reviewed)  By: HUEY Monahan-C  ------------------------------------------------------------  Time: 04/08 1906  Value: Bicarbonate: 25  Comment: (Reviewed)  By: HUEY Monahan-C  ------------------------------------------------------------  Time: 04/08 1906  Value: BNP: 8  Comment: (Reviewed)  By: HUEY Monahan-C  ------------------------------------------------------------  Time: 04/08 1906  Value: Troponin I, High Sensitivity: <3  Comment: (Reviewed)  By: HUEY Monahan-C  ------------------------------------------------------------  Time: 04/08 1906  Value: D-Dimer Non VTE, Quant (ng/mL FEU): 266  Comment: (Reviewed)  By: Giovanny Feliz PA-C  ------------------------------------------------------------  Time: 04/08 1906  Value: HCG, Beta-Quantitative: <2  Comment: (Reviewed)  By: Giovanny Feliz PA-C  ------------------------------------------------------------  Time: 04/08 1906  Value: WBC: 11.2  Comment: (Reviewed)  By: Giovanny Feliz PA-C  ------------------------------------------------------------  Time: 04/08 1906  Value: HEMOGLOBIN: 13.9  Comment: (Reviewed)  By: Giovanny Feliz PA-C  ------------------------------------------------------------  Time: 04/08 2006  Value: MAGNESIUM: 1.62  Comment: (Reviewed)  By: Giovanny Feliz PA-C  ------------------------------------------------------------  Time: 04/08 2006  Value: Thyroid Stimulating Hormone: 0.72  Comment: (Reviewed)  By: Giovanny Feliz PA-C  ------------------------------------------------------------  Time: 04/08 2006  Value: Troponin  I, High Sensitivity: <3  Comment: (Reviewed)  By: Giovanny Feliz PA-C  ------------------------------------------------------------  Time: 04/08 2103  Comment: CT IMPRESSION:1.  No acute pulmonary embolism to the segmental arterial level.2. There are patchy bilateral airspace and ground-glass opacitieswith area of consolidation in the left lower lobe concerning formultifocal pneumonia. Clinical correlation and posttreatmentfollow-up is recommended to ensure complete resolution.3. Heterogeneous enlarged right lobe of the thyroid gland with a 3 cmhypodense nodule. Correlate with thyroid function tests andnonemergent thyroid ultrasound.4. Additional findings as described above.  By: Giovanny Feliz PA-C    ------------------------------------------------------------  Diagnoses as of 04/08/25 2130  Pneumonia of both lungs due to infectious organism, unspecified part of lung   Shortness of breath  Sinus tachycardia     Disposition  As a result of their workup, the patient will require admission to the hospital.  The patient was informed of her diagnosis.  The patient was given the opportunity to ask questions and I answered them. The patient agreed to be admitted to the hospital.      This was a shared visit with an ED attending.  The patient was seen and discussed with the ED attending    Giovanny Feliz PA-C  Emergency Medicine            Procedure  Procedures     Giovanny Feliz PA-C  04/08/25 2133

## 2025-04-09 NOTE — ASSESSMENT & PLAN NOTE
Multifocal pneumonia  Telemetry observation admission to Dr. Gillette  See imaging results above  Legionella/strep urine  DuoNeb treatments per RT  Continue supplemental oxygen via nasal cannula  Blood cultures pending  Continue IV azithromycin and ceftriaxone  Tylenol as needed  Mucinex p.o. twice daily  Tessalon Perles 3 times daily as needed  Mag-Ox 400 milligrams p.o. daily  Continue LR at 50 mL/hour  Repeat labs in a.m.    Schizoaffective disorder/lupus  #Chronic conditions

## 2025-04-09 NOTE — PROGRESS NOTES
04/09/25 1515   Discharge Planning   Living Arrangements Spouse/significant other;Children   Support Systems Spouse/significant other;Children;Family members   Expected Discharge Disposition Home   Does the patient need discharge transport arranged? No     Patient lives at home with her  and 3 young children.  She does not use mobility aids and is independent with all ADLs.  Anticipate patient will return home when she is medically cleared for discharge.  Care Coordination team following for assistance with discharge planning as needed.  Mukul KNUTSON TCC

## 2025-04-09 NOTE — H&P
History Of Present Illness  Ryan Scott is a 29 y.o. female with past medical history significant for lupus and schizoaffective disorder presenting to emergency department for evaluation of chest pain and shortness of breath.  Patient states she has had the symptoms for the last several days.  Patient reports a sudden onset of pleuritic discomfort to the right chest, nonradiating.  Patient states that has been intermittent and states that around the same time she began experiencing a nonproductive cough.  Patient denies fever, recent illness, body aches, or chills.  Patient states that she is not currently being treated for her lupus, denies any recent travel, trauma, or injury.  Patient is a smoker and does occasionally vape.    In ED, patient's labs are all within normal limits.  Flu, COVID, RSV negative.  An EKG was completed showing sinus tachycardia at a rate of 128 without ST elevation or depression per ED physician review.  A chest x-ray was completed showing stable left retrocardial atelectasis per radiology review.  A CT angio chest was completed and negative for PE showing patchy bilateral airspace groundglass opacities with areas of consolidation in the left lower lobe concerning for multifocal pneumonia per radiology review.  Blood cultures obtained and pending.  Patient started on IV azithromycin and ceftriaxone.  Patient given normal saline x 2 L in ED as well as 4 mg of Decadron IV.  Blood pressure currently 117/91, heart rate 92, respirations 18, temperature 36.1 °C, SpO2 99% on supplemental oxygen via nasal cannula.  Patient provided with DuoNeb treatments per RT.  Patient is currently on Adderall at home twice daily.  Medicated with Tylenol p.o.  Mg +1.62 and will be given magnesium 400 mg p.o.  Patient will be admitted to telemetry observation under the care of Dr. Luis Gillette who will continue to follow.  I was asked to do H&P and place initial admission orders.    Prior medical  "records reviewed.     Past Medical History  Schizoaffective disorder, lupus  Surgical History  , D&C, skin biopsy       Social History  Current smoker, occasional vape, no drug use, no alcohol use  Family History  Reviewed and noncontributory     Allergies  Lurasidone and Clonazepam    Review of Systems  A 10 point review of systems was completed and is negative except what is listed in HPI  Physical Exam  Constitutional:       Appearance: Normal appearance.   HENT:      Mouth/Throat:      Mouth: Mucous membranes are dry.      Pharynx: Oropharynx is clear.   Eyes:      Pupils: Pupils are equal, round, and reactive to light.   Cardiovascular:      Rate and Rhythm: Regular rhythm. Tachycardia present.   Pulmonary:      Effort: Tachypnea present.      Breath sounds: Decreased breath sounds present.   Abdominal:      General: Bowel sounds are normal.      Palpations: Abdomen is soft.   Musculoskeletal:         General: Normal range of motion.      Cervical back: Normal range of motion.   Skin:     General: Skin is warm.      Capillary Refill: Capillary refill takes less than 2 seconds.   Neurological:      General: No focal deficit present.      Mental Status: She is alert and oriented to person, place, and time.          Last Recorded Vitals  Blood pressure 117/71, pulse 91, temperature 35.9 °C (96.6 °F), resp. rate 19, height 1.651 m (5' 5\"), weight 77.1 kg (170 lb), SpO2 99%.    Relevant Results  CT angio chest for pulmonary embolism    Result Date: 2025  Interpreted By:  Cheo Mojica, STUDY: CT ANGIO CHEST FOR PULMONARY EMBOLISM;  2025 8:12 pm   INDICATION: Signs/Symptoms:cp sob.   COMPARISON: CT scan of the chest 2022.   ACCESSION NUMBER(S): [id number]   ORDERING CLINICIAN: RIVERA VILLANUEVA   TECHNIQUE: Helical data acquisition of the chest was obtained after intravenous administration of  80 mL of Omnipaque 350. Images were reformatted in axial, coronal, and sagittal planes. Axial and " coronal MIPS were reconstructed and reviewed.   FINDINGS: HEART AND VESSELS: No acute pulmonary embolism to the  segmental arterial level.   Main pulmonary artery and its branches are normal in caliber.   The thoracic aorta is of normal course and caliber  without vascular calcifications.   No coronary artery calcifications are seen.The study is not optimized for evaluation of coronary arteries.   The cardiac chambers are not enlarged.   Trace pericardial fluid.   MEDIASTINUM AND GRAY, LOWER NECK AND AXILLA: Heterogeneous enlarged below lobe of the thyroid gland with a 3 cm nodule noted.   No evidence of thoracic lymphadenopathy by CT criteria. Fibrofatty tissue in the anterior mediastinum likely relates to residual thymic tissue.   Esophagus appears within normal limits as seen.   LUNGS AND AIRWAYS: The trachea and central airways are patent. No endobronchial lesion.   There are patchy airspace and nodular opacities with areas of consolidation in the left lower lobe concerning for multifocal pneumonia. No effusion or pneumothorax.   UPPER ABDOMEN: Liver is hypodense relative to the spleen which can be perfusional or relate to component of steatosis.   CHEST WALL AND OSSEOUS STRUCTURES: There are no suspicious osseous lesions.       1.  No acute pulmonary embolism to the segmental arterial level. 2. There are patchy bilateral airspace and ground-glass opacities with area of consolidation in the left lower lobe concerning for multifocal pneumonia. Clinical correlation and posttreatment follow-up is recommended to ensure complete resolution. 3. Heterogeneous enlarged right lobe of the thyroid gland with a 3 cm hypodense nodule. Correlate with thyroid function tests and nonemergent thyroid ultrasound. 4. Additional findings as described above.     MACRO: None   Signed by: Cheo Mojica 4/8/2025 8:44 PM Dictation workstation:   [id number]    XR chest 1 view    Result Date: 4/8/2025  Interpreted By:  David Aguero  STUDY: XR CHEST 1 VIEW;  4/8/2025 6:32 pm   INDICATION: Signs/Symptoms:sob.     COMPARISON: 06/03/2022   ACCESSION NUMBER(S): [id number]   ORDERING CLINICIAN: RIVERA VILLANUEVA   FINDINGS:     The cardiomediastinal silhouette and pulmonary vasculature are within normal limits. There is similar atelectasis in the medial left lower lobe compared to prior study. No consolidation, pleural effusion or pneumothorax.         No acute cardiopulmonary process.   Stable subsegmental left retrocardiac atelectasis.   MACRO: None.   Signed by: David Aguero 4/8/2025 6:44 PM Dictation workstation:   [id number]   Results for orders placed or performed during the hospital encounter of 04/08/25 (from the past 24 hours)   CBC and Auto Differential   Result Value Ref Range    WBC 11.2 4.4 - 11.3 x10*3/uL    nRBC 0.0 0.0 - 0.0 /100 WBCs    RBC 4.87 4.00 - 5.20 x10*6/uL    Hemoglobin 13.9 12.0 - 16.0 g/dL    Hematocrit 43.2 36.0 - 46.0 %    MCV 89 80 - 100 fL    MCH 28.5 26.0 - 34.0 pg    MCHC 32.2 32.0 - 36.0 g/dL    RDW 13.3 11.5 - 14.5 %    Platelets 252 150 - 450 x10*3/uL    Neutrophils % 86.8 40.0 - 80.0 %    Immature Granulocytes %, Automated 0.5 0.0 - 0.9 %    Lymphocytes % 8.5 13.0 - 44.0 %    Monocytes % 3.8 2.0 - 10.0 %    Eosinophils % 0.2 0.0 - 6.0 %    Basophils % 0.2 0.0 - 2.0 %    Neutrophils Absolute 9.76 (H) 1.20 - 7.70 x10*3/uL    Immature Granulocytes Absolute, Automated 0.06 0.00 - 0.70 x10*3/uL    Lymphocytes Absolute 0.95 (L) 1.20 - 4.80 x10*3/uL    Monocytes Absolute 0.43 0.10 - 1.00 x10*3/uL    Eosinophils Absolute 0.02 0.00 - 0.70 x10*3/uL    Basophils Absolute 0.02 0.00 - 0.10 x10*3/uL   Comprehensive metabolic panel   Result Value Ref Range    Glucose 98 74 - 99 mg/dL    Sodium 137 136 - 145 mmol/L    Potassium 3.6 3.5 - 5.3 mmol/L    Chloride 103 98 - 107 mmol/L    Bicarbonate 25 21 - 32 mmol/L    Anion Gap 13 10 - 20 mmol/L    Urea Nitrogen 12 6 - 23 mg/dL    Creatinine 0.68 0.50 - 1.05 mg/dL    eGFR >90  >60 mL/min/1.73m*2    Calcium 9.2 8.6 - 10.3 mg/dL    Albumin 4.2 3.4 - 5.0 g/dL    Alkaline Phosphatase 74 33 - 110 U/L    Total Protein 6.8 6.4 - 8.2 g/dL    AST 12 9 - 39 U/L    Bilirubin, Total 1.1 0.0 - 1.2 mg/dL    ALT 18 7 - 45 U/L   Troponin I, High Sensitivity   Result Value Ref Range    Troponin I, High Sensitivity <3 0 - 13 ng/L   D-dimer, quantitative   Result Value Ref Range    D-Dimer Non VTE, Quant (ng/mL FEU) 266 <=500 ng/mL FEU   Human Chorionic Gonadotropin, Serum Quantitative   Result Value Ref Range    HCG, Beta-Quantitative <2 <5 mIU/mL   B-Type Natriuretic Peptide   Result Value Ref Range    BNP 8 0 - 99 pg/mL   Blood Gas Venous   Result Value Ref Range    POCT pH, Venous 7.48 (H) 7.33 - 7.43 pH    POCT pCO2, Venous 34 (L) 41 - 51 mm Hg    POCT pO2, Venous 18 (L) 35 - 45 mm Hg    POCT SO2, Venous 32 (L) 45 - 75 %    POCT Oxy Hemoglobin, Venous 30.1 (L) 45.0 - 75.0 %    POCT Base Excess, Venous 2.2 -2.0 - 3.0 mmol/L    POCT HCO3 Calculated, Venous 25.3 22.0 - 26.0 mmol/L    Patient Temperature 37.0 degrees Celsius    FiO2 21 %   Troponin I, High Sensitivity   Result Value Ref Range    Troponin I, High Sensitivity <3 0 - 13 ng/L   Magnesium   Result Value Ref Range    Magnesium 1.62 1.60 - 2.40 mg/dL   TSH with reflex to Free T4 if abnormal   Result Value Ref Range    Thyroid Stimulating Hormone 0.72 0.44 - 3.98 mIU/L   Lactate   Result Value Ref Range    Lactate 0.7 0.4 - 2.0 mmol/L   Blood Culture    Specimen: Peripheral Venipuncture; Blood culture   Result Value Ref Range    Blood Culture Loaded on Instrument - Culture in progress    Blood Culture    Specimen: Peripheral Venipuncture; Blood culture   Result Value Ref Range    Blood Culture Loaded on Instrument - Culture in progress    Sars-CoV-2 and Influenza A/B PCR   Result Value Ref Range    Flu A Result Not Detected Not Detected    Flu B Result Not Detected Not Detected    Coronavirus 2019, PCR Not Detected Not Detected   RSV PCR   Result  Value Ref Range    RSV PCR Not Detected Not Detected   Sterile Cup   Result Value Ref Range    Extra Tube Hold for add-ons.           Assessment/Plan   Ryan is a 29-year-old female patient with a history of lupus and schizoaffective disorder who presents to emergency department for evaluation of chest pain and shortness of breath.  Patient states she also developed a nonproductive cough at this time.  Per CT of the chest patient was found to have multifocal pneumonia.  All labs within normal limits.  Magnesium 1.6 to have borderline and will be given p.o. magnesium 400 mg.  Blood cultures pending.  Patient started on IV azithromycin and ceftriaxone.  Patient given IV fluids in ED and Decadron 4 mg IV.  Vital signs improving with IV fluids, patient admitted to observation for further medical management.  Assessment & Plan  Pneumonia of both lungs due to infectious organism, unspecified part of lung  Multifocal pneumonia  Telemetry observation admission to Dr. Gillette  See imaging results above  Legionella/strep urine  DuoNeb treatments per RT  Continue supplemental oxygen via nasal cannula  Blood cultures pending  Continue IV azithromycin and ceftriaxone  Tylenol as needed  Mucinex p.o. twice daily  Tessalon Perles 3 times daily as needed  Mag-Ox 400 milligrams p.o. daily  Continue LR at 50 mL/hour  Repeat labs in a.m.    Schizoaffective disorder/lupus  #Chronic conditions  Continue home Adderall twice daily  Cardiac diet    DVT Ppx  Activity as tolerated  Early and frequent ambulation    I spent 45 minutes in the professional and overall care of this patient.  Debi Glass, APRN-CNP

## 2025-04-09 NOTE — PROGRESS NOTES
Attending admit note/H&P patient has been seen in ER by ER physician was practitioner appreciate help patient with history of lupus presented with shortness of breath cough was admitted with pneumonia Ryan Scott is a 29 y.o. female on day 1 of admission presenting with Pneumonia of both lungs due to infectious organism, unspecified part of lung.  Admission orders reviewed and amended as needed      Subjective   Patient states she feels better still short of breath with exertion.       Objective     Last Recorded Vitals  /67   Pulse 90   Temp 35.9 °C (96.6 °F) (Temporal)   Resp 19   Wt 77.1 kg (170 lb)   SpO2 97%   Intake/Output last 3 Shifts:    Intake/Output Summary (Last 24 hours) at 4/9/2025 0904  Last data filed at 4/9/2025 0247  Gross per 24 hour   Intake 1224.17 ml   Output --   Net 1224.17 ml       Admission Weight  Weight: 77.1 kg (170 lb) (04/08/25 1735)    Scheduled medications  amphetamine-dextroamphetamine, 30 mg, oral, q12h ITA  azithromycin, 500 mg, intravenous, q24h  cefTRIAXone, 2 g, intravenous, q24h  guaiFENesin, 600 mg, oral, BID  ipratropium-albuteroL, 3 mL, nebulization, TID  magnesium oxide, 400 mg, oral, Daily      Continuous medications  lactated Ringer's, 50 mL/hr, Last Rate: 50 mL/hr (04/09/25 0247)      PRN medications  PRN medications: acetaminophen, benzonatate, ipratropium-albuteroL, oxygen    Daily Weight  04/08/25 : 77.1 kg (170 lb)    Recent Lab Results - 24 Hours  Results for orders placed or performed during the hospital encounter of 04/08/25 (from the past 24 hours)   Electrocardiogram, 12-lead PRN ACS symptoms   Result Value Ref Range    Ventricular Rate 128 BPM    Atrial Rate 128 BPM    VA Interval 138 ms    QRS Duration 70 ms    QT Interval 282 ms    QTC Calculation(Bazett) 411 ms    P Axis 45 degrees    R Axis 57 degrees    T Axis 24 degrees    QRS Count 21 beats    Q Onset 219 ms    P Onset 150 ms    P Offset 213 ms    T Offset 360 ms    QTC Fredericia  363 ms   CBC and Auto Differential   Result Value Ref Range    WBC 11.2 4.4 - 11.3 x10*3/uL    nRBC 0.0 0.0 - 0.0 /100 WBCs    RBC 4.87 4.00 - 5.20 x10*6/uL    Hemoglobin 13.9 12.0 - 16.0 g/dL    Hematocrit 43.2 36.0 - 46.0 %    MCV 89 80 - 100 fL    MCH 28.5 26.0 - 34.0 pg    MCHC 32.2 32.0 - 36.0 g/dL    RDW 13.3 11.5 - 14.5 %    Platelets 252 150 - 450 x10*3/uL    Neutrophils % 86.8 40.0 - 80.0 %    Immature Granulocytes %, Automated 0.5 0.0 - 0.9 %    Lymphocytes % 8.5 13.0 - 44.0 %    Monocytes % 3.8 2.0 - 10.0 %    Eosinophils % 0.2 0.0 - 6.0 %    Basophils % 0.2 0.0 - 2.0 %    Neutrophils Absolute 9.76 (H) 1.20 - 7.70 x10*3/uL    Immature Granulocytes Absolute, Automated 0.06 0.00 - 0.70 x10*3/uL    Lymphocytes Absolute 0.95 (L) 1.20 - 4.80 x10*3/uL    Monocytes Absolute 0.43 0.10 - 1.00 x10*3/uL    Eosinophils Absolute 0.02 0.00 - 0.70 x10*3/uL    Basophils Absolute 0.02 0.00 - 0.10 x10*3/uL   Comprehensive metabolic panel   Result Value Ref Range    Glucose 98 74 - 99 mg/dL    Sodium 137 136 - 145 mmol/L    Potassium 3.6 3.5 - 5.3 mmol/L    Chloride 103 98 - 107 mmol/L    Bicarbonate 25 21 - 32 mmol/L    Anion Gap 13 10 - 20 mmol/L    Urea Nitrogen 12 6 - 23 mg/dL    Creatinine 0.68 0.50 - 1.05 mg/dL    eGFR >90 >60 mL/min/1.73m*2    Calcium 9.2 8.6 - 10.3 mg/dL    Albumin 4.2 3.4 - 5.0 g/dL    Alkaline Phosphatase 74 33 - 110 U/L    Total Protein 6.8 6.4 - 8.2 g/dL    AST 12 9 - 39 U/L    Bilirubin, Total 1.1 0.0 - 1.2 mg/dL    ALT 18 7 - 45 U/L   Troponin I, High Sensitivity   Result Value Ref Range    Troponin I, High Sensitivity <3 0 - 13 ng/L   D-dimer, quantitative   Result Value Ref Range    D-Dimer Non VTE, Quant (ng/mL FEU) 266 <=500 ng/mL FEU   Human Chorionic Gonadotropin, Serum Quantitative   Result Value Ref Range    HCG, Beta-Quantitative <2 <5 mIU/mL   B-Type Natriuretic Peptide   Result Value Ref Range    BNP 8 0 - 99 pg/mL   Blood Gas Venous   Result Value Ref Range    POCT pH, Venous 7.48  (H) 7.33 - 7.43 pH    POCT pCO2, Venous 34 (L) 41 - 51 mm Hg    POCT pO2, Venous 18 (L) 35 - 45 mm Hg    POCT SO2, Venous 32 (L) 45 - 75 %    POCT Oxy Hemoglobin, Venous 30.1 (L) 45.0 - 75.0 %    POCT Base Excess, Venous 2.2 -2.0 - 3.0 mmol/L    POCT HCO3 Calculated, Venous 25.3 22.0 - 26.0 mmol/L    Patient Temperature 37.0 degrees Celsius    FiO2 21 %   Troponin I, High Sensitivity   Result Value Ref Range    Troponin I, High Sensitivity <3 0 - 13 ng/L   Magnesium   Result Value Ref Range    Magnesium 1.62 1.60 - 2.40 mg/dL   TSH with reflex to Free T4 if abnormal   Result Value Ref Range    Thyroid Stimulating Hormone 0.72 0.44 - 3.98 mIU/L   Lactate   Result Value Ref Range    Lactate 0.7 0.4 - 2.0 mmol/L   Blood Culture    Specimen: Peripheral Venipuncture; Blood culture   Result Value Ref Range    Blood Culture Loaded on Instrument - Culture in progress    Blood Culture    Specimen: Peripheral Venipuncture; Blood culture   Result Value Ref Range    Blood Culture Loaded on Instrument - Culture in progress    Sars-CoV-2 and Influenza A/B PCR   Result Value Ref Range    Flu A Result Not Detected Not Detected    Flu B Result Not Detected Not Detected    Coronavirus 2019, PCR Not Detected Not Detected   RSV PCR   Result Value Ref Range    RSV PCR Not Detected Not Detected   Sterile Cup   Result Value Ref Range    Extra Tube Hold for add-ons.    Lactate   Result Value Ref Range    Lactate 0.7 0.4 - 2.0 mmol/L   CBC   Result Value Ref Range    WBC 13.3 (H) 4.4 - 11.3 x10*3/uL    nRBC 0.0 0.0 - 0.0 /100 WBCs    RBC 4.37 4.00 - 5.20 x10*6/uL    Hemoglobin 12.4 12.0 - 16.0 g/dL    Hematocrit 40.6 36.0 - 46.0 %    MCV 93 80 - 100 fL    MCH 28.4 26.0 - 34.0 pg    MCHC 30.5 (L) 32.0 - 36.0 g/dL    RDW 12.9 11.5 - 14.5 %    Platelets 219 150 - 450 x10*3/uL   Basic Metabolic Panel   Result Value Ref Range    Glucose 123 (H) 74 - 99 mg/dL    Sodium 135 (L) 136 - 145 mmol/L    Potassium 4.2 3.5 - 5.3 mmol/L    Chloride 105 98  - 107 mmol/L    Bicarbonate 21 21 - 32 mmol/L    Anion Gap 13 10 - 20 mmol/L    Urea Nitrogen 8 6 - 23 mg/dL    Creatinine 0.48 (L) 0.50 - 1.05 mg/dL    eGFR >90 >60 mL/min/1.73m*2    Calcium 8.8 8.6 - 10.3 mg/dL      Image Results  CT angio chest for pulmonary embolism    Result Date: 4/8/2025  1.  No acute pulmonary embolism to the segmental arterial level. 2. There are patchy bilateral airspace and ground-glass opacities with area of consolidation in the left lower lobe concerning for multifocal pneumonia. Clinical correlation and posttreatment follow-up is recommended to ensure complete resolution. 3. Heterogeneous enlarged right lobe of the thyroid gland with a 3 cm hypodense nodule. Correlate with thyroid function tests and nonemergent thyroid ultrasound. 4. Additional findings as described above.     MACRO: None   Signed by: Cheo Mojica 4/8/2025 8:44 PM Dictation workstation:   [id number]    XR chest 1 view    Result Date: 4/8/2025  No acute cardiopulmonary process.   Stable subsegmental left retrocardiac atelectasis.   MACRO: None.   Signed by: David Aguero 4/8/2025 6:44 PM Dictation workstation:   [id number]          29 y.o. female with past medical history significant for lupus and schizoaffective disorder presenting to emergency department for evaluation of chest pain and shortness of breath.  Patient states she has had the symptoms for the last several days.  Patient reports a sudden onset of pleuritic discomfort to the right chest, nonradiating.  Patient states that has been intermittent and states that around the same time she began experiencing a nonproductive cough.  Patient denies fever, recent illness, body aches, or chills.  Patient states that she is not currently being treated for her lupus, denies any recent travel, trauma, or injury.  Patient is a smoker and does occasionally vape.     In ED, patient's labs are all within normal limits.  Flu, COVID, RSV negative.  An EKG was  completed showing sinus tachycardia at a rate of 128 without ST elevation or depression per ED physician review.  A chest x-ray was completed showing stable left retrocardial atelectasis per radiology review.  A CT angio chest was completed and negative for PE showing patchy bilateral airspace groundglass opacities with areas of consolidation in the left lower lobe concerning for multifocal pneumonia per radiology review.  Blood cultures obtained and pending.  Patient started on IV azithromycin and ceftriaxone.  Patient given normal saline x 2 L in ED as well as 4 mg of Decadron IV.  Blood pressure currently 117/91, heart rate 92, respirations 18, temperature 36.1 °C, SpO2 99% on supplemental oxygen via nasal cannula.  Patient provided with DuoNeb treatments per RT.  Patient is currently on Adderall at home twice daily.  Medicated with Tylenol p.o.  Mg +1.62 and will be given magnesium 400 mg p.o.  Patient will be admitted to telemetry observation under the care of Dr. Luis Gillette who will continue to follow.  I was asked to do H&P and place initial admission orders.     Prior medical records reviewed.     Past Medical History  Schizoaffective disorder, lupus  Surgical History  , D&C, skin biopsy        Social History  Current smoker, occasional vape, no drug use, no alcohol use  Family History  Reviewed and noncontributory     Allergies  Lurasidone and Clonazepam     Review of Systems  A 10 point review of systems was completed and is negative except what is listed in HPI  Physical Exam 25  Constitutional:       Appearance: Normal appearance.   HENT:      Mouth/Throat:      Mouth: Mucous membranes are moist.      Pharynx: Oropharynx is clear.   Eyes:      Pupils: Pupils are equal, round, and reactive to light.   Cardiovascular:      Rate and Rhythm: Regular rhythm. Tachycardia present.   Pulmonary:      Effort: Tachypnea present.      Breath sounds: Decreased breath sounds present.  Scattered  "wheezes  Abdominal:      General: Bowel sounds are normal.      Palpations: Abdomen is soft.   Musculoskeletal:         General: Normal range of motion.      Cervical back: Normal range of motion.   Skin:     General: Skin is warm.      Capillary Refill: Capillary refill takes less than 2 seconds.   Neurological:      General: No focal deficit present.      Mental Status: She is alert and oriented to person, place, and time.            Last Recorded Vitals  Blood pressure 117/71, pulse 91, temperature 35.9 °C (96.6 °F), resp. rate 19, height 1.651 m (5' 5\"), weight 77.1 kg (170 lb), SpO2 99%.     Relevant Results  CT angio chest for pulmonary embolism     Result Date: 4/8/2025  Interpreted By:  Cheo Mojica, STUDY: CT ANGIO CHEST FOR PULMONARY EMBOLISM;  4/8/2025 8:12 pm   INDICATION: Signs/Symptoms:cp sob.   COMPARISON: CT scan of the chest 06/30/2022.   ACCESSION NUMBER(S): [id number]   ORDERING CLINICIAN: RIVERA VILLANUEVA   TECHNIQUE: Helical data acquisition of the chest was obtained after intravenous administration of  80 mL of Omnipaque 350. Images were reformatted in axial, coronal, and sagittal planes. Axial and coronal MIPS were reconstructed and reviewed.   FINDINGS: HEART AND VESSELS: No acute pulmonary embolism to the  segmental arterial level.   Main pulmonary artery and its branches are normal in caliber.   The thoracic aorta is of normal course and caliber  without vascular calcifications.   No coronary artery calcifications are seen.The study is not optimized for evaluation of coronary arteries.   The cardiac chambers are not enlarged.   Trace pericardial fluid.   MEDIASTINUM AND GRAY, LOWER NECK AND AXILLA: Heterogeneous enlarged below lobe of the thyroid gland with a 3 cm nodule noted.   No evidence of thoracic lymphadenopathy by CT criteria. Fibrofatty tissue in the anterior mediastinum likely relates to residual thymic tissue.   Esophagus appears within normal limits as seen.   LUNGS AND " AIRWAYS: The trachea and central airways are patent. No endobronchial lesion.   There are patchy airspace and nodular opacities with areas of consolidation in the left lower lobe concerning for multifocal pneumonia. No effusion or pneumothorax.   UPPER ABDOMEN: Liver is hypodense relative to the spleen which can be perfusional or relate to component of steatosis.   CHEST WALL AND OSSEOUS STRUCTURES: There are no suspicious osseous lesions.        1.  No acute pulmonary embolism to the segmental arterial level. 2. There are patchy bilateral airspace and ground-glass opacities with area of consolidation in the left lower lobe concerning for multifocal pneumonia. Clinical correlation and posttreatment follow-up is recommended to ensure complete resolution. 3. Heterogeneous enlarged right lobe of the thyroid gland with a 3 cm hypodense nodule. Correlate with thyroid function tests and nonemergent thyroid ultrasound. 4. Additional findings as described above.     MACRO: None   Signed by: Cheo Mojica 4/8/2025 8:44 PM Dictation workstation:   [id number]     XR chest 1 view     Result Date: 4/8/2025  Interpreted By:  David Aguero, STUDY: XR CHEST 1 VIEW;  4/8/2025 6:32 pm   INDICATION: Signs/Symptoms:sob.     COMPARISON: 06/03/2022   ACCESSION NUMBER(S): [id number]   ORDERING CLINICIAN: RIVERA VILLANUEVA   FINDINGS:     The cardiomediastinal silhouette and pulmonary vasculature are within normal limits. There is similar atelectasis in the medial left lower lobe compared to prior study. No consolidation, pleural effusion or pneumothorax.          No acute cardiopulmonary process.   Stable subsegmental left retrocardiac atelectasis.   MACRO: None.   Signed by: David Aguero 4/8/2025 6:44 PM Dictation workstation:   [id number]         Results for orders placed or performed during the hospital encounter of 04/08/25 (from the past 24 hours)   CBC and Auto Differential   Result Value Ref Range     WBC 11.2 4.4 - 11.3  x10*3/uL     nRBC 0.0 0.0 - 0.0 /100 WBCs     RBC 4.87 4.00 - 5.20 x10*6/uL     Hemoglobin 13.9 12.0 - 16.0 g/dL     Hematocrit 43.2 36.0 - 46.0 %     MCV 89 80 - 100 fL     MCH 28.5 26.0 - 34.0 pg     MCHC 32.2 32.0 - 36.0 g/dL     RDW 13.3 11.5 - 14.5 %     Platelets 252 150 - 450 x10*3/uL     Neutrophils % 86.8 40.0 - 80.0 %     Immature Granulocytes %, Automated 0.5 0.0 - 0.9 %     Lymphocytes % 8.5 13.0 - 44.0 %     Monocytes % 3.8 2.0 - 10.0 %     Eosinophils % 0.2 0.0 - 6.0 %     Basophils % 0.2 0.0 - 2.0 %     Neutrophils Absolute 9.76 (H) 1.20 - 7.70 x10*3/uL     Immature Granulocytes Absolute, Automated 0.06 0.00 - 0.70 x10*3/uL     Lymphocytes Absolute 0.95 (L) 1.20 - 4.80 x10*3/uL     Monocytes Absolute 0.43 0.10 - 1.00 x10*3/uL     Eosinophils Absolute 0.02 0.00 - 0.70 x10*3/uL     Basophils Absolute 0.02 0.00 - 0.10 x10*3/uL   Comprehensive metabolic panel   Result Value Ref Range     Glucose 98 74 - 99 mg/dL     Sodium 137 136 - 145 mmol/L     Potassium 3.6 3.5 - 5.3 mmol/L     Chloride 103 98 - 107 mmol/L     Bicarbonate 25 21 - 32 mmol/L     Anion Gap 13 10 - 20 mmol/L     Urea Nitrogen 12 6 - 23 mg/dL     Creatinine 0.68 0.50 - 1.05 mg/dL     eGFR >90 >60 mL/min/1.73m*2     Calcium 9.2 8.6 - 10.3 mg/dL     Albumin 4.2 3.4 - 5.0 g/dL     Alkaline Phosphatase 74 33 - 110 U/L     Total Protein 6.8 6.4 - 8.2 g/dL     AST 12 9 - 39 U/L     Bilirubin, Total 1.1 0.0 - 1.2 mg/dL     ALT 18 7 - 45 U/L   Troponin I, High Sensitivity   Result Value Ref Range     Troponin I, High Sensitivity <3 0 - 13 ng/L   D-dimer, quantitative   Result Value Ref Range     D-Dimer Non VTE, Quant (ng/mL FEU) 266 <=500 ng/mL FEU   Human Chorionic Gonadotropin, Serum Quantitative   Result Value Ref Range     HCG, Beta-Quantitative <2 <5 mIU/mL   B-Type Natriuretic Peptide   Result Value Ref Range     BNP 8 0 - 99 pg/mL   Blood Gas Venous   Result Value Ref Range     POCT pH, Venous 7.48 (H) 7.33 - 7.43 pH     POCT pCO2, Venous  34 (L) 41 - 51 mm Hg     POCT pO2, Venous 18 (L) 35 - 45 mm Hg     POCT SO2, Venous 32 (L) 45 - 75 %     POCT Oxy Hemoglobin, Venous 30.1 (L) 45.0 - 75.0 %     POCT Base Excess, Venous 2.2 -2.0 - 3.0 mmol/L     POCT HCO3 Calculated, Venous 25.3 22.0 - 26.0 mmol/L     Patient Temperature 37.0 degrees Celsius     FiO2 21 %   Troponin I, High Sensitivity   Result Value Ref Range     Troponin I, High Sensitivity <3 0 - 13 ng/L   Magnesium   Result Value Ref Range     Magnesium 1.62 1.60 - 2.40 mg/dL   TSH with reflex to Free T4 if abnormal   Result Value Ref Range     Thyroid Stimulating Hormone 0.72 0.44 - 3.98 mIU/L   Lactate   Result Value Ref Range     Lactate 0.7 0.4 - 2.0 mmol/L   Blood Culture     Specimen: Peripheral Venipuncture; Blood culture   Result Value Ref Range     Blood Culture Loaded on Instrument - Culture in progress     Blood Culture     Specimen: Peripheral Venipuncture; Blood culture   Result Value Ref Range     Blood Culture Loaded on Instrument - Culture in progress     Sars-CoV-2 and Influenza A/B PCR   Result Value Ref Range     Flu A Result Not Detected Not Detected     Flu B Result Not Detected Not Detected     Coronavirus 2019, PCR Not Detected Not Detected   RSV PCR   Result Value Ref Range     RSV PCR Not Detected Not Detected   Sterile Cup   Result Value Ref Range     Extra Tube Hold for add-ons.                 Assessment/Plan  Ryan is a 29-year-old female patient with a history of lupus and schizoaffective disorder who presents to emergency department for evaluation of chest pain and shortness of breath.  Patient states she also developed a nonproductive cough at this time.  Per CT of the chest patient was found to have multifocal pneumonia.  All labs within normal limits.  Magnesium 1.6 to have borderline and will be given p.o. magnesium 400 mg.  Blood cultures pending.  Patient started on IV azithromycin and ceftriaxone.  Patient given IV fluids in ED and Decadron 4 mg IV.   Vital signs improving with IV fluids, patient patient partially responded and stable to be discharged within 24-hour for discontinue continue IV antibiotics IV fluids poor p.o. intake  Dose of steroids given given scattered wheezing    Assessment & Plan  Pneumonia of both lungs due to infectious organism, unspecified part of lung  Multifocal pneumonia  Telemetry observation admission to Dr. Gillette  See imaging results above  Legionella/strep urine  DuoNeb treatments per RT  Continue supplemental oxygen via nasal cannula  Blood cultures pending  Continue IV azithromycin and ceftriaxone  Tylenol as needed  Mucinex p.o. twice daily  Tessalon Perles 3 times daily as needed  Mag-Ox 400 milligrams p.o. daily  Continue LR at 50 mL/hour  Repeat labs in a.m.     Schizoaffective disorder/lupus  #Chronic conditions  Continue home Adderall twice daily  Cardiac diet     DVT Ppx  Activity as tolerated  Early and frequent ambulation    Plan as per orders and above    Luis Gillette MD

## 2025-04-09 NOTE — CARE PLAN
The patient's goals for the shift include      The clinical goals for the shift include      Over the shift, the patient did not make progress toward the following goals. Barriers to progression include shortness of breath. Recommendations to address these barriers include follow poc.

## 2025-04-10 VITALS
WEIGHT: 170 LBS | DIASTOLIC BLOOD PRESSURE: 60 MMHG | TEMPERATURE: 97.2 F | HEART RATE: 96 BPM | RESPIRATION RATE: 18 BRPM | SYSTOLIC BLOOD PRESSURE: 113 MMHG | HEIGHT: 65 IN | OXYGEN SATURATION: 97 % | BODY MASS INDEX: 28.32 KG/M2

## 2025-04-10 LAB
C3 SERPL-MCNC: 126 MG/DL (ref 87–200)
C4 SERPL-MCNC: 21 MG/DL (ref 10–50)
HOLD SPECIMEN: NORMAL
LEGIONELLA AG UR QL: NEGATIVE
S PNEUM AG UR QL: NEGATIVE

## 2025-04-10 PROCEDURE — 94640 AIRWAY INHALATION TREATMENT: CPT

## 2025-04-10 PROCEDURE — 36415 COLL VENOUS BLD VENIPUNCTURE: CPT | Performed by: INTERNAL MEDICINE

## 2025-04-10 PROCEDURE — 2500000002 HC RX 250 W HCPCS SELF ADMINISTERED DRUGS (ALT 637 FOR MEDICARE OP, ALT 636 FOR OP/ED): Performed by: INTERNAL MEDICINE

## 2025-04-10 PROCEDURE — 86160 COMPLEMENT ANTIGEN: CPT | Mod: PARLAB | Performed by: INTERNAL MEDICINE

## 2025-04-10 PROCEDURE — 86162 COMPLEMENT TOTAL (CH50): CPT | Performed by: INTERNAL MEDICINE

## 2025-04-10 PROCEDURE — 2500000005 HC RX 250 GENERAL PHARMACY W/O HCPCS: Performed by: INTERNAL MEDICINE

## 2025-04-10 PROCEDURE — 87899 AGENT NOS ASSAY W/OPTIC: CPT | Mod: PARLAB | Performed by: INTERNAL MEDICINE

## 2025-04-10 PROCEDURE — 2500000004 HC RX 250 GENERAL PHARMACY W/ HCPCS (ALT 636 FOR OP/ED): Performed by: NURSE PRACTITIONER

## 2025-04-10 PROCEDURE — 86738 MYCOPLASMA ANTIBODY: CPT | Performed by: INTERNAL MEDICINE

## 2025-04-10 PROCEDURE — 87449 NOS EACH ORGANISM AG IA: CPT | Mod: PARLAB | Performed by: INTERNAL MEDICINE

## 2025-04-10 PROCEDURE — 2500000002 HC RX 250 W HCPCS SELF ADMINISTERED DRUGS (ALT 637 FOR MEDICARE OP, ALT 636 FOR OP/ED): Performed by: EMERGENCY MEDICINE

## 2025-04-10 RX ORDER — BENZONATATE 100 MG/1
100 CAPSULE ORAL 3 TIMES DAILY PRN
Qty: 20 CAPSULE | Refills: 0 | Status: SHIPPED | OUTPATIENT
Start: 2025-04-10

## 2025-04-10 RX ORDER — AZITHROMYCIN 250 MG/1
500 TABLET, FILM COATED ORAL
Status: DISCONTINUED | OUTPATIENT
Start: 2025-04-10 | End: 2025-04-10 | Stop reason: HOSPADM

## 2025-04-10 RX ORDER — LEVOFLOXACIN 500 MG/1
500 TABLET, FILM COATED ORAL DAILY
Qty: 5 TABLET | Refills: 0 | Status: SHIPPED | OUTPATIENT
Start: 2025-04-10 | End: 2025-04-15

## 2025-04-10 RX ORDER — GUAIFENESIN 600 MG/1
600 TABLET, EXTENDED RELEASE ORAL 2 TIMES DAILY
Start: 2025-04-10

## 2025-04-10 RX ORDER — METHYLPREDNISOLONE 4 MG/1
TABLET ORAL
Qty: 21 TABLET | Refills: 0 | Status: SHIPPED | OUTPATIENT
Start: 2025-04-10 | End: 2025-04-16

## 2025-04-10 RX ORDER — LANOLIN ALCOHOL/MO/W.PET/CERES
400 CREAM (GRAM) TOPICAL DAILY
Start: 2025-04-11

## 2025-04-10 RX ADMIN — Medication 2 L/MIN: at 06:19

## 2025-04-10 RX ADMIN — Medication 1 L/MIN: at 08:18

## 2025-04-10 RX ADMIN — MAGNESIUM OXIDE TAB 400 MG (241.3 MG ELEMENTAL MG) 400 MG: 400 (241.3 MG) TAB at 08:17

## 2025-04-10 RX ADMIN — GUAIFENESIN 600 MG: 600 TABLET, EXTENDED RELEASE ORAL at 08:17

## 2025-04-10 RX ADMIN — IPRATROPIUM BROMIDE AND ALBUTEROL SULFATE 3 ML: .5; 3 SOLUTION RESPIRATORY (INHALATION) at 07:01

## 2025-04-10 RX ADMIN — IPRATROPIUM BROMIDE AND ALBUTEROL SULFATE 3 ML: .5; 3 SOLUTION RESPIRATORY (INHALATION) at 11:59

## 2025-04-10 RX ADMIN — AZITHROMYCIN DIHYDRATE 500 MG: 250 TABLET ORAL at 15:05

## 2025-04-10 ASSESSMENT — COGNITIVE AND FUNCTIONAL STATUS - GENERAL
MOBILITY SCORE: 24
DAILY ACTIVITIY SCORE: 24

## 2025-04-10 ASSESSMENT — PAIN SCALES - GENERAL: PAINLEVEL_OUTOF10: 0 - NO PAIN

## 2025-04-10 NOTE — PROGRESS NOTES
IV to PO Conversion    Ryan Scott is a 29 y.o. female who qualifies for IV to PO therapy conversion.    Inclusion and exclusion criteria have been evaluated. Will change existing order for azithromycin 500 mg IV every 24 hours  to azithromycin 500 mg PO every 24 hours  per protocol/policy.      Please contact Pharmacy with any questions.  Mikayla Salazar, AneeshD, BCCCP

## 2025-04-10 NOTE — CONSULTS
Pulmonary Critical Care note    Patient Name :Ryan Scott  Date of service : 04/10/25  MRN: [id number]  YOB: 1995      Interval History:    History of Present Illness:      29 y.o. female  on day  2 of admission presenting with Pneumonia of both lungs due to infectious organism, unspecified part of lung.  Patient was admitted after recent sick contact from her kids were with increased shortness of breath, wheezing and coughing, she CT scan of the chest showed multilobar groundglass infiltrate, unfortunately patient had been homeless for almost 10-month and had not been having access to medical care she had history of lupus in the past that with the (sedation similar to this presentation with shortness of breath and was diagnosed with lupus pneumonitis several years ago     past Medical/Surgical History:    has a past medical history of Encounter for supervision of normal first pregnancy, third trimester, Encounter for supervision of normal pregnancy, unspecified, unspecified trimester (2021), Encounter for supervision of normal pregnancy, unspecified, unspecified trimester (2020), and Schizoaffective disorder, unspecified.   has a past surgical history that includes Other surgical history (2016);  section, classic (10/27/2017); and Dilation and curettage of uterus (2017).   reports that she has never smoked. She has never used smokeless tobacco. She reports that she does not currently use alcohol. She reports that she does not use drugs.  Family History:   No relevant family history has been documented for this patient.    Allergies:     Lurasidone and Clonazepam      Social history:   reports that she has never smoked. She has never used smokeless tobacco. She reports that she does not currently use alcohol. She reports that she does not use drugs.      Review of Systems:   General: denies weight gain, denies loss of appetite, fever, chills, night  sweats.  HEENT: denies headaches, dizziness, head trauma, visual changes, eye pain, tinnitus, nosebleeds, hoarseness or throat pain    Respiratory: denies chest pain, dyspnea, cough and hemoptysis  Cardiovascular: denies orthopnea, paroxysmal nocturnal dyspnea, leg swelling, and previous heart attack.    Gastrointestinal: denies pain, nausea vomiting, diarrhea, constipation, melena or bleeding.  Genitourinary: denies hematuria, frequency, urgency or dysuria  Neurology: denies syncope, seizures, paralysis, paraesthesia   Endocrine: denies polyuria, polydipsia, skin or hair changes, and heat or cold intolerance  Musculoskeletal: denies joint pain, swelling, arthritis or myalgia  Hematologic: denies bleeding, adenopathy and easy bruising  Skin: denies rashes and skin discoloration  Psychiatry: denies depression    Physical Exam:   Vital Signs:   Vitals:    04/10/25 0356   BP: 104/56   Pulse: 85   Resp: 18   Temp: 36.2 °C (97.2 °F)   SpO2: 95%     Vitals:    04/08/25 1735   Weight: 77.1 kg (170 lb)         Input/Output:    Intake/Output Summary (Last 24 hours) at 4/10/2025 1136  Last data filed at 4/9/2025 2332  Gross per 24 hour   Intake 300 ml   Output --   Net 300 ml       Oxygen requirements:    Ventilator Information:     Oxygen Therapy/Pulse Ox  Medical Gas Therapy: None (Room air)  Medical Gas Delivery Method: Nasal cannula  SpO2: 95 %  Patient Activity During SpO2 Measurement: At rest  Temp: 36.2 °C (97.2 °F)        General appearance: Not in pain or distress, in no respiratory distress    HEENT: Atraumatic/normocephalic, EOMI, ALF, pharynx clear, moist mucosa, redness of the uvula appreciated,   Neck: Supple, no jugular venous distension, lymphadenopathy, thyromegaly or carotid bruits  Chest: mild rhonchi  Cardiovascular: Normal S1, S2, regular rate and rhythm, no murmur, rub or gallop  Abdomen: Normal sounds present, soft, lax with no tenderness, no hepatosplenomegaly, and no masses  Extremities: No edema.  Pulses are equally present.   Skin: intact, no rashes   Neurologic: Alert and oriented x 3, No focal deficit         Current Medications:   Scheduled medications  amphetamine-dextroamphetamine, 30 mg, oral, q12h ITA  azithromycin, 500 mg, intravenous, q24h  cefTRIAXone, 2 g, intravenous, q24h  guaiFENesin, 600 mg, oral, BID  ipratropium-albuteroL, 3 mL, nebulization, TID  magnesium oxide, 400 mg, oral, Daily  oxygen, , inhalation, Continuous - Inhalation      Continuous medications     PRN medications  PRN medications: acetaminophen, benzonatate, ipratropium-albuteroL      Investigations:  Labs, radiological imaging and cardiac work up were personally reviewed    Results from last 7 days   Lab Units 04/09/25  0639 04/08/25  1803   SODIUM mmol/L 135* 137   POTASSIUM mmol/L 4.2 3.6   CHLORIDE mmol/L 105 103   CO2 mmol/L 21 25   BUN mg/dL 8 12   CREATININE mg/dL 0.48* 0.68   GLUCOSE mg/dL 123* 98   CALCIUM mg/dL 8.8 9.2     Results from last 7 days   Lab Units 04/09/25  0639   WBC AUTO x10*3/uL 13.3*   HEMOGLOBIN g/dL 12.4   HEMATOCRIT % 40.6   PLATELETS AUTO x10*3/uL 219         Imaging  CT angio chest for pulmonary embolism    Result Date: 4/8/2025  1.  No acute pulmonary embolism to the segmental arterial level. 2. There are patchy bilateral airspace and ground-glass opacities with area of consolidation in the left lower lobe concerning for multifocal pneumonia. Clinical correlation and posttreatment follow-up is recommended to ensure complete resolution. 3. Heterogeneous enlarged right lobe of the thyroid gland with a 3 cm hypodense nodule. Correlate with thyroid function tests and nonemergent thyroid ultrasound. 4. Additional findings as described above.     MACRO: None   Signed by: Cheo Mojica 4/8/2025 8:44 PM Dictation workstation:   [id number]    XR chest 1 view    Result Date: 4/8/2025  No acute cardiopulmonary process.   Stable subsegmental left retrocardiac atelectasis.   MACRO: None.   Signed by: David  Laci 4/8/2025 6:44 PM Dictation workstation:   [id number]     Cardiology, Vascular, and Other Imaging  Electrocardiogram, 12-lead PRN ACS symptoms    Result Date: 4/9/2025  Sinus tachycardia Nonspecific T wave abnormality Abnormal ECG When compared with ECG of 07-JUL-2023 11:06, No significant change was found       Assessment  Ryan Scott IS 29 y.o. female  on day  2 of admission presenting with Pneumonia of both lungs due to infectious organism, unspecified part of lung. Actively being treated for the  following medical Problems:  29 y.o. female  on day  2 of admission presenting with Pneumonia of both lungs due to infectious organism, unspecified part of lung.  Patient was admitted after recent sick contact from her kids were with increased shortness of breath, wheezing and coughing, she CT scan of the chest showed multilobar groundglass infiltrate, unfortunately patient had been homeless for almost 10-month and had not been having access to medical care she had history of lupus in the past that with the (sedation similar to this presentation with shortness of breath and was diagnosed with lupus pneumonitis several years ago   Multilobar pulm infiltrate.  Pattern is suggestive of atypical infection less likely lupus pneumonitis  History of underlying lupus    Recommendation:  Patient reports improvement of her shortness of breath already with IV Rocephin and azithromycin community-acquired pneumonia protocol  She is eager to go home and she may discharge from pulmonary standpoint but it would be reasonable to complete CT scan of the chest in 6 to 8 weeks with complete pulmonary function test  Patient is encouraged to establish new primary care for continuation of her lupus care  Upon discharge would recommend discharge on antibiotics for total 7 days with atypical coverage/Mucinex  Check complement levels/Legionella/pneumococcal/mycoplasma titers          Arin Roy MD  Pulmonary critical  care consultant  04/10/25  11:36 AM       STAFF PHYSICIAN NOTE OF PERSONAL INVOLVEMENT IN CARE  As the attending physician, I certify that I personally reviewed the patient's history and personally examined the patient to confirm the physical findings described above, and that I reviewed the relevant imaging studies and available reports.  I also discussed the differential diagnosis and all of the proposed management plans with the patient and individuals accompanying the patient to this visit.  They had the opportunity to ask questions about the proposed management plans and to have those questions answered.

## 2025-04-10 NOTE — DISCHARGE SUMMARY
Discharge Diagnosis  Pneumonia of both lungs due to infectious organism, unspecified part of lung    Issues Requiring Follow-Up  ***    Discharge Meds     Medication List      START taking these medications     benzonatate 100 mg capsule; Commonly known as: Tessalon; Take 1 capsule   (100 mg) by mouth 3 times a day as needed for cough. Do not crush or chew.   guaiFENesin 600 mg 12 hr tablet; Commonly known as: Mucinex; Take 1   tablet (600 mg) by mouth 2 times a day. Do not crush, chew, or split.   levoFLOXacin 500 mg tablet; Commonly known as: Levaquin; Take 1 tablet   (500 mg) by mouth once daily for 5 days.   magnesium oxide 400 mg (241.3 mg magnesium) tablet; Commonly known as:   Mag-Ox; Take 1 tablet (400 mg) by mouth once daily.; Start taking on:   April 11, 2025   methylPREDNISolone 4 mg tablets; Commonly known as: Medrol Dospak; Take   as directed on package.     CONTINUE taking these medications     amphetamine-dextroamphetamine 30 mg tablet; Commonly known as: Adderall       Test Results Pending At Discharge  Pending Labs       Order Current Status    C3 complement In process    C4 complement In process    Complement, total In process    Legionella Antigen, Urine In process    Mycoplasma pneumoniae antibody, IgM In process    Streptococcus pneumoniae Antigen, Urine In process    Blood Culture Preliminary result    Blood Culture Preliminary result            Hospital Course   ***    Pertinent Physical Exam At Time of Discharge  Physical Exam    Outpatient Follow-Up  No future appointments.      Luis Gillette MD

## 2025-04-10 NOTE — CARE PLAN
The patient's goals for the shift include      The clinical goals for the shift include comfort  and safety      Problem: Pain - Adult  Goal: Verbalizes/displays adequate comfort level or baseline comfort level  Outcome: Progressing     Problem: Safety - Adult  Goal: Free from fall injury  Outcome: Progressing     Problem: Discharge Planning  Goal: Discharge to home or other facility with appropriate resources  Outcome: Progressing     Problem: Chronic Conditions and Co-morbidities  Goal: Patient's chronic conditions and co-morbidity symptoms are monitored and maintained or improved  Outcome: Progressing     Problem: Nutrition  Goal: Nutrient intake appropriate for maintaining nutritional needs  Outcome: Progressing     Problem: Skin  Goal: Decreased wound size/increased tissue granulation at next dressing change  Outcome: Progressing  Goal: Participates in plan/prevention/treatment measures  Outcome: Progressing  Goal: Prevent/manage excess moisture  Outcome: Progressing  Goal: Prevent/minimize sheer/friction injuries  Outcome: Progressing  Goal: Promote/optimize nutrition  Outcome: Progressing

## 2025-04-10 NOTE — PROGRESS NOTES
Ryan Scott is a 29 y.o. female on day 2 of admission presenting with Pneumonia of both lungs due to infectious organism, unspecified part of lung.      Subjective   ***       Objective     Last Recorded Vitals  /56   Pulse 85   Temp 36.2 °C (97.2 °F)   Resp 18   Wt 77.1 kg (170 lb)   SpO2 95%   Intake/Output last 3 Shifts:    Intake/Output Summary (Last 24 hours) at 4/10/2025 1417  Last data filed at 4/9/2025 2332  Gross per 24 hour   Intake 540 ml   Output --   Net 540 ml       Admission Weight  Weight: 77.1 kg (170 lb) (04/08/25 1735)    Scheduled medications  amphetamine-dextroamphetamine, 30 mg, oral, q12h ITA  azithromycin, 500 mg, intravenous, q24h  cefTRIAXone, 2 g, intravenous, q24h  guaiFENesin, 600 mg, oral, BID  ipratropium-albuteroL, 3 mL, nebulization, TID  magnesium oxide, 400 mg, oral, Daily  oxygen, , inhalation, Continuous - Inhalation      Continuous medications     PRN medications  PRN medications: acetaminophen, benzonatate, ipratropium-albuteroL    Daily Weight  04/08/25 : 77.1 kg (170 lb)    Recent Lab Results - 24 Hours  Results for orders placed or performed during the hospital encounter of 04/08/25 (from the past 24 hours)   POCT GLUCOSE   Result Value Ref Range    POCT Glucose 120 (H) 74 - 99 mg/dL      Image Results  CT angio chest for pulmonary embolism    Result Date: 4/8/2025  1.  No acute pulmonary embolism to the segmental arterial level. 2. There are patchy bilateral airspace and ground-glass opacities with area of consolidation in the left lower lobe concerning for multifocal pneumonia. Clinical correlation and posttreatment follow-up is recommended to ensure complete resolution. 3. Heterogeneous enlarged right lobe of the thyroid gland with a 3 cm hypodense nodule. Correlate with thyroid function tests and nonemergent thyroid ultrasound. 4. Additional findings as described above.     MACRO: None   Signed by: Cheo Mojica 4/8/2025 8:44 PM Dictation  workstation:   [id number]    XR chest 1 view    Result Date: 4/8/2025  No acute cardiopulmonary process.   Stable subsegmental left retrocardiac atelectasis.   MACRO: None.   Signed by: David Aguero 4/8/2025 6:44 PM Dictation workstation:   [id number]            29 y.o. female with past medical history significant for lupus and schizoaffective disorder presenting to emergency department for evaluation of chest pain and shortness of breath.  Patient states she has had the symptoms for the last several days.  Patient reports a sudden onset of pleuritic discomfort to the right chest, nonradiating.  Patient states that has been intermittent and states that around the same time she began experiencing a nonproductive cough.  Patient denies fever, recent illness, body aches, or chills.  Patient states that she is not currently being treated for her lupus, denies any recent travel, trauma, or injury.  Patient is a smoker and does occasionally vape.     In ED, patient's labs are all within normal limits.  Flu, COVID, RSV negative.  An EKG was completed showing sinus tachycardia at a rate of 128 without ST elevation or depression per ED physician review.  A chest x-ray was completed showing stable left retrocardial atelectasis per radiology review.  A CT angio chest was completed and negative for PE showing patchy bilateral airspace groundglass opacities with areas of consolidation in the left lower lobe concerning for multifocal pneumonia per radiology review.  Blood cultures obtained and pending.  Patient started on IV azithromycin and ceftriaxone.  Patient given normal saline x 2 L in ED as well as 4 mg of Decadron IV.  Blood pressure currently 117/91, heart rate 92, respirations 18, temperature 36.1 °C, SpO2 99% on supplemental oxygen via nasal cannula.  Patient provided with DuoNeb treatments per RT.  Patient is currently on Adderall at home twice daily.  Medicated with Tylenol p.o.  Mg +1.62 and will be given  "magnesium 400 mg p.o.  Patient will be admitted to telemetry observation under the care of Dr. Luis Gillette who will continue to follow.  I was asked to do H&P and place initial admission orders.     Prior medical records reviewed.     Past Medical History  Schizoaffective disorder, lupus  Surgical History  , D&C, skin biopsy        Social History  Current smoker, occasional vape, no drug use, no alcohol use  Family History  Reviewed and noncontributory     Allergies  Lurasidone and Clonazepam     Review of Systems  A 10 point review of systems was completed and is negative except what is listed in HPI  Physical Exam 25  Constitutional:       Appearance: Normal appearance.   HENT:      Mouth/Throat:      Mouth: Mucous membranes are moist.      Pharynx: Oropharynx is clear.   Eyes:      Pupils: Pupils are equal, round, and reactive to light.   Cardiovascular:      Rate and Rhythm: Regular rhythm. Tachycardia present.   Pulmonary:      Effort: Tachypnea present.      Breath sounds: Decreased breath sounds present.  Scattered wheezes  Abdominal:      General: Bowel sounds are normal.      Palpations: Abdomen is soft.   Musculoskeletal:         General: Normal range of motion.      Cervical back: Normal range of motion.   Skin:     General: Skin is warm.      Capillary Refill: Capillary refill takes less than 2 seconds.   Neurological:      General: No focal deficit present.      Mental Status: She is alert and oriented to person, place, and time.            Last Recorded Vitals  Blood pressure 117/71, pulse 91, temperature 35.9 °C (96.6 °F), resp. rate 19, height 1.651 m (5' 5\"), weight 77.1 kg (170 lb), SpO2 99%.     Relevant Results  CT angio chest for pulmonary embolism     Result Date: 2025  Interpreted By:  Cheo Mojica, STUDY: CT ANGIO CHEST FOR PULMONARY EMBOLISM;  2025 8:12 pm   INDICATION: Signs/Symptoms:cp sob.   COMPARISON: CT scan of the chest 2022.   ACCESSION NUMBER(S): " [id number]   ORDERING CLINICIAN: RIVERA VILLANUEVA   TECHNIQUE: Helical data acquisition of the chest was obtained after intravenous administration of  80 mL of Omnipaque 350. Images were reformatted in axial, coronal, and sagittal planes. Axial and coronal MIPS were reconstructed and reviewed.   FINDINGS: HEART AND VESSELS: No acute pulmonary embolism to the  segmental arterial level.   Main pulmonary artery and its branches are normal in caliber.   The thoracic aorta is of normal course and caliber  without vascular calcifications.   No coronary artery calcifications are seen.The study is not optimized for evaluation of coronary arteries.   The cardiac chambers are not enlarged.   Trace pericardial fluid.   MEDIASTINUM AND GRAY, LOWER NECK AND AXILLA: Heterogeneous enlarged below lobe of the thyroid gland with a 3 cm nodule noted.   No evidence of thoracic lymphadenopathy by CT criteria. Fibrofatty tissue in the anterior mediastinum likely relates to residual thymic tissue.   Esophagus appears within normal limits as seen.   LUNGS AND AIRWAYS: The trachea and central airways are patent. No endobronchial lesion.   There are patchy airspace and nodular opacities with areas of consolidation in the left lower lobe concerning for multifocal pneumonia. No effusion or pneumothorax.   UPPER ABDOMEN: Liver is hypodense relative to the spleen which can be perfusional or relate to component of steatosis.   CHEST WALL AND OSSEOUS STRUCTURES: There are no suspicious osseous lesions.        1.  No acute pulmonary embolism to the segmental arterial level. 2. There are patchy bilateral airspace and ground-glass opacities with area of consolidation in the left lower lobe concerning for multifocal pneumonia. Clinical correlation and posttreatment follow-up is recommended to ensure complete resolution. 3. Heterogeneous enlarged right lobe of the thyroid gland with a 3 cm hypodense nodule. Correlate with thyroid function tests and  nonemergent thyroid ultrasound. 4. Additional findings as described above.     MACRO: None   Signed by: Cheo Mojica 4/8/2025 8:44 PM Dictation workstation:   [id number]     XR chest 1 view     Result Date: 4/8/2025  Interpreted By:  David Aguero, STUDY: XR CHEST 1 VIEW;  4/8/2025 6:32 pm   INDICATION: Signs/Symptoms:sob.     COMPARISON: 06/03/2022   ACCESSION NUMBER(S): [id number]   ORDERING CLINICIAN: RIVERA VILLANUEVA   FINDINGS:     The cardiomediastinal silhouette and pulmonary vasculature are within normal limits. There is similar atelectasis in the medial left lower lobe compared to prior study. No consolidation, pleural effusion or pneumothorax.          No acute cardiopulmonary process.   Stable subsegmental left retrocardiac atelectasis.   MACRO: None.   Signed by: David Aguero 4/8/2025 6:44 PM Dictation workstation:   [id number]             Results for orders placed or performed during the hospital encounter of 04/08/25 (from the past 24 hours)   CBC and Auto Differential   Result Value Ref Range     WBC 11.2 4.4 - 11.3 x10*3/uL     nRBC 0.0 0.0 - 0.0 /100 WBCs     RBC 4.87 4.00 - 5.20 x10*6/uL     Hemoglobin 13.9 12.0 - 16.0 g/dL     Hematocrit 43.2 36.0 - 46.0 %     MCV 89 80 - 100 fL     MCH 28.5 26.0 - 34.0 pg     MCHC 32.2 32.0 - 36.0 g/dL     RDW 13.3 11.5 - 14.5 %     Platelets 252 150 - 450 x10*3/uL     Neutrophils % 86.8 40.0 - 80.0 %     Immature Granulocytes %, Automated 0.5 0.0 - 0.9 %     Lymphocytes % 8.5 13.0 - 44.0 %     Monocytes % 3.8 2.0 - 10.0 %     Eosinophils % 0.2 0.0 - 6.0 %     Basophils % 0.2 0.0 - 2.0 %     Neutrophils Absolute 9.76 (H) 1.20 - 7.70 x10*3/uL     Immature Granulocytes Absolute, Automated 0.06 0.00 - 0.70 x10*3/uL     Lymphocytes Absolute 0.95 (L) 1.20 - 4.80 x10*3/uL     Monocytes Absolute 0.43 0.10 - 1.00 x10*3/uL     Eosinophils Absolute 0.02 0.00 - 0.70 x10*3/uL     Basophils Absolute 0.02 0.00 - 0.10 x10*3/uL   Comprehensive metabolic panel    Result Value Ref Range     Glucose 98 74 - 99 mg/dL     Sodium 137 136 - 145 mmol/L     Potassium 3.6 3.5 - 5.3 mmol/L     Chloride 103 98 - 107 mmol/L     Bicarbonate 25 21 - 32 mmol/L     Anion Gap 13 10 - 20 mmol/L     Urea Nitrogen 12 6 - 23 mg/dL     Creatinine 0.68 0.50 - 1.05 mg/dL     eGFR >90 >60 mL/min/1.73m*2     Calcium 9.2 8.6 - 10.3 mg/dL     Albumin 4.2 3.4 - 5.0 g/dL     Alkaline Phosphatase 74 33 - 110 U/L     Total Protein 6.8 6.4 - 8.2 g/dL     AST 12 9 - 39 U/L     Bilirubin, Total 1.1 0.0 - 1.2 mg/dL     ALT 18 7 - 45 U/L   Troponin I, High Sensitivity   Result Value Ref Range     Troponin I, High Sensitivity <3 0 - 13 ng/L   D-dimer, quantitative   Result Value Ref Range     D-Dimer Non VTE, Quant (ng/mL FEU) 266 <=500 ng/mL FEU   Human Chorionic Gonadotropin, Serum Quantitative   Result Value Ref Range     HCG, Beta-Quantitative <2 <5 mIU/mL   B-Type Natriuretic Peptide   Result Value Ref Range     BNP 8 0 - 99 pg/mL   Blood Gas Venous   Result Value Ref Range     POCT pH, Venous 7.48 (H) 7.33 - 7.43 pH     POCT pCO2, Venous 34 (L) 41 - 51 mm Hg     POCT pO2, Venous 18 (L) 35 - 45 mm Hg     POCT SO2, Venous 32 (L) 45 - 75 %     POCT Oxy Hemoglobin, Venous 30.1 (L) 45.0 - 75.0 %     POCT Base Excess, Venous 2.2 -2.0 - 3.0 mmol/L     POCT HCO3 Calculated, Venous 25.3 22.0 - 26.0 mmol/L     Patient Temperature 37.0 degrees Celsius     FiO2 21 %   Troponin I, High Sensitivity   Result Value Ref Range     Troponin I, High Sensitivity <3 0 - 13 ng/L   Magnesium   Result Value Ref Range     Magnesium 1.62 1.60 - 2.40 mg/dL   TSH with reflex to Free T4 if abnormal   Result Value Ref Range     Thyroid Stimulating Hormone 0.72 0.44 - 3.98 mIU/L   Lactate   Result Value Ref Range     Lactate 0.7 0.4 - 2.0 mmol/L   Blood Culture     Specimen: Peripheral Venipuncture; Blood culture   Result Value Ref Range     Blood Culture Loaded on Instrument - Culture in progress     Blood Culture     Specimen:  Peripheral Venipuncture; Blood culture   Result Value Ref Range     Blood Culture Loaded on Instrument - Culture in progress     Sars-CoV-2 and Influenza A/B PCR   Result Value Ref Range     Flu A Result Not Detected Not Detected     Flu B Result Not Detected Not Detected     Coronavirus 2019, PCR Not Detected Not Detected   RSV PCR   Result Value Ref Range     RSV PCR Not Detected Not Detected   Sterile Cup   Result Value Ref Range     Extra Tube Hold for add-ons.                 Assessment/Plan  Ryan is a 29-year-old female patient with a history of lupus and schizoaffective disorder who presents to emergency department for evaluation of chest pain and shortness of breath.  Patient states she also developed a nonproductive cough at this time.  Per CT of the chest patient was found to have multifocal pneumonia.  All labs within normal limits.  Magnesium 1.6 to have borderline and will be given p.o. magnesium 400 mg.  Blood cultures pending.  Patient started on IV azithromycin and ceftriaxone.  Patient given IV fluids in ED and Decadron 4 mg IV.  Vital signs improving with IV fluids, patient patient partially responded and stable to be discharged within 24-hour for discontinue continue IV antibiotics IV fluids poor p.o. intake  Dose of steroids given given scattered wheezing     Assessment & Plan  Pneumonia of both lungs due to infectious organism, unspecified part of lung  Multifocal pneumonia    Thyroid nodule     Schizoaffective disorder/lupus  #Chronic conditions  Continue home Adderall twice daily  Cardiac diet     DVT Ppx  Activity as tolerated  Early and frequent ambulation                     Luis Gillette MD

## 2025-04-11 LAB
ATRIAL RATE: 128 BPM
LEGIONELLA AG UR QL: NEGATIVE
P AXIS: 45 DEGREES
P OFFSET: 213 MS
P ONSET: 150 MS
PR INTERVAL: 138 MS
Q ONSET: 219 MS
QRS COUNT: 21 BEATS
QRS DURATION: 70 MS
QT INTERVAL: 282 MS
QTC CALCULATION(BAZETT): 411 MS
QTC FREDERICIA: 363 MS
R AXIS: 57 DEGREES
S PNEUM AG UR QL: NEGATIVE
T AXIS: 24 DEGREES
T OFFSET: 360 MS
VENTRICULAR RATE: 128 BPM

## 2025-04-12 LAB — CH50 SERPL-ACNC: 68.5 U/ML (ref 38.7–89.9)

## 2025-04-13 LAB
BACTERIA BLD CULT: NORMAL
BACTERIA BLD CULT: NORMAL
M PNEUMO IGM SER IA-ACNC: 1.25 U/L